# Patient Record
Sex: MALE | Race: BLACK OR AFRICAN AMERICAN | NOT HISPANIC OR LATINO | URBAN - METROPOLITAN AREA
[De-identification: names, ages, dates, MRNs, and addresses within clinical notes are randomized per-mention and may not be internally consistent; named-entity substitution may affect disease eponyms.]

---

## 2018-01-21 ENCOUNTER — INPATIENT (INPATIENT)
Facility: HOSPITAL | Age: 73
LOS: 3 days | Discharge: HOME | DRG: 435 | End: 2018-01-25
Attending: INTERNAL MEDICINE | Admitting: HOSPITALIST
Payer: COMMERCIAL

## 2018-01-21 VITALS
TEMPERATURE: 99 F | SYSTOLIC BLOOD PRESSURE: 166 MMHG | DIASTOLIC BLOOD PRESSURE: 85 MMHG | OXYGEN SATURATION: 97 % | HEART RATE: 97 BPM | RESPIRATION RATE: 16 BRPM

## 2018-01-21 PROCEDURE — 99285 EMERGENCY DEPT VISIT HI MDM: CPT | Mod: 25

## 2018-01-21 PROCEDURE — 93010 ELECTROCARDIOGRAM REPORT: CPT

## 2018-01-21 NOTE — ED ADULT NURSE NOTE - OBJECTIVE STATEMENT
72y male with hx htn, DM, prostate ca presents to the ER c/o abdominal pain. pt is alert and oriented x 4 and speaking coherently. Pt states he recently traveled to [place], pt states he had a sudden onset of abdominal pain 12/24/2017 while in [place]. Pt found to have mass of pancreas and enlarged gall bladder. pt abdomen distended. Pt denies cp, sob, fevers, n/v. c/o diarrhea and chills. Pt states he did not receive treatment while in [place]. Pt in NAD. Pending md fraser. will reassess.

## 2018-01-22 DIAGNOSIS — E87.6 HYPOKALEMIA: ICD-10-CM

## 2018-01-22 DIAGNOSIS — D68.9 COAGULATION DEFECT, UNSPECIFIED: ICD-10-CM

## 2018-01-22 DIAGNOSIS — E80.6 OTHER DISORDERS OF BILIRUBIN METABOLISM: ICD-10-CM

## 2018-01-22 DIAGNOSIS — C25.0 MALIGNANT NEOPLASM OF HEAD OF PANCREAS: ICD-10-CM

## 2018-01-22 DIAGNOSIS — K86.9 DISEASE OF PANCREAS, UNSPECIFIED: ICD-10-CM

## 2018-01-22 DIAGNOSIS — E11.9 TYPE 2 DIABETES MELLITUS WITHOUT COMPLICATIONS: ICD-10-CM

## 2018-01-22 DIAGNOSIS — Z29.9 ENCOUNTER FOR PROPHYLACTIC MEASURES, UNSPECIFIED: ICD-10-CM

## 2018-01-22 DIAGNOSIS — K83.8 OTHER SPECIFIED DISEASES OF BILIARY TRACT: ICD-10-CM

## 2018-01-22 DIAGNOSIS — Z85.46 PERSONAL HISTORY OF MALIGNANT NEOPLASM OF PROSTATE: ICD-10-CM

## 2018-01-22 DIAGNOSIS — K85.10 BILIARY ACUTE PANCREATITIS WITHOUT NECROSIS OR INFECTION: ICD-10-CM

## 2018-01-22 LAB
ALBUMIN SERPL ELPH-MCNC: 3.1 G/DL — LOW (ref 3.3–5)
ALBUMIN SERPL ELPH-MCNC: 3.1 G/DL — LOW (ref 3.3–5)
ALP SERPL-CCNC: 669 U/L — HIGH (ref 40–120)
ALP SERPL-CCNC: 677 U/L — HIGH (ref 40–120)
ALT FLD-CCNC: 219 U/L RC — HIGH (ref 10–45)
ALT FLD-CCNC: 240 U/L RC — HIGH (ref 10–45)
ANION GAP SERPL CALC-SCNC: 11 MMOL/L — SIGNIFICANT CHANGE UP (ref 5–17)
ANION GAP SERPL CALC-SCNC: 18 MMOL/L — HIGH (ref 5–17)
ANISOCYTOSIS BLD QL: SLIGHT — SIGNIFICANT CHANGE UP
APPEARANCE UR: CLEAR — SIGNIFICANT CHANGE UP
APTT BLD: 39.9 SEC — HIGH (ref 27.5–37.4)
APTT BLD: 40.2 SEC — HIGH (ref 27.5–37.4)
AST SERPL-CCNC: 243 U/L — HIGH (ref 10–40)
AST SERPL-CCNC: 246 U/L — HIGH (ref 10–40)
BACTERIA # UR AUTO: ABNORMAL /HPF
BASE EXCESS BLDV CALC-SCNC: 1.4 MMOL/L — SIGNIFICANT CHANGE UP (ref -2–2)
BASOPHILS # BLD AUTO: 0 K/UL — SIGNIFICANT CHANGE UP (ref 0–0.2)
BASOPHILS # BLD AUTO: 0 K/UL — SIGNIFICANT CHANGE UP (ref 0–0.2)
BASOPHILS NFR BLD AUTO: 0 % — SIGNIFICANT CHANGE UP (ref 0–2)
BASOPHILS NFR BLD AUTO: 0.1 % — SIGNIFICANT CHANGE UP (ref 0–2)
BILIRUB SERPL-MCNC: 39 MG/DL — HIGH (ref 0.2–1.2)
BILIRUB SERPL-MCNC: 39.4 MG/DL — HIGH (ref 0.2–1.2)
BILIRUB UR-MCNC: ABNORMAL
BUN SERPL-MCNC: 14 MG/DL — SIGNIFICANT CHANGE UP (ref 7–23)
BUN SERPL-MCNC: 15 MG/DL — SIGNIFICANT CHANGE UP (ref 7–23)
CALCIUM SERPL-MCNC: 9.7 MG/DL — SIGNIFICANT CHANGE UP (ref 8.4–10.5)
CALCIUM SERPL-MCNC: 9.9 MG/DL — SIGNIFICANT CHANGE UP (ref 8.4–10.5)
CANCER AG125 SERPL-ACNC: 28 U/ML — SIGNIFICANT CHANGE UP
CEA SERPL-MCNC: 2.5 NG/ML — SIGNIFICANT CHANGE UP (ref 0–3.8)
CHLORIDE SERPL-SCNC: 100 MMOL/L — SIGNIFICANT CHANGE UP (ref 96–108)
CHLORIDE SERPL-SCNC: 93 MMOL/L — LOW (ref 96–108)
CO2 BLDV-SCNC: 28 MMOL/L — SIGNIFICANT CHANGE UP (ref 22–30)
CO2 SERPL-SCNC: 20 MMOL/L — LOW (ref 22–31)
CO2 SERPL-SCNC: 25 MMOL/L — SIGNIFICANT CHANGE UP (ref 22–31)
COLOR SPEC: ABNORMAL
CREAT SERPL-MCNC: 0.94 MG/DL — SIGNIFICANT CHANGE UP (ref 0.5–1.3)
CREAT SERPL-MCNC: 0.96 MG/DL — SIGNIFICANT CHANGE UP (ref 0.5–1.3)
D DIMER BLD IA.RAPID-MCNC: 329 NG/ML DDU — HIGH
DACRYOCYTES BLD QL SMEAR: SLIGHT — SIGNIFICANT CHANGE UP
DIFF PNL FLD: ABNORMAL
EOSINOPHIL # BLD AUTO: 0.1 K/UL — SIGNIFICANT CHANGE UP (ref 0–0.5)
EOSINOPHIL # BLD AUTO: 0.3 K/UL — SIGNIFICANT CHANGE UP (ref 0–0.5)
EOSINOPHIL NFR BLD AUTO: 0 % — SIGNIFICANT CHANGE UP (ref 0–6)
EOSINOPHIL NFR BLD AUTO: 1.1 % — SIGNIFICANT CHANGE UP (ref 0–6)
EPI CELLS # UR: SIGNIFICANT CHANGE UP /HPF
FIBRINOGEN PPP-MCNC: 851 MG/DL — HIGH (ref 310–510)
GAS PNL BLDV: SIGNIFICANT CHANGE UP
GLUCOSE BLDC GLUCOMTR-MCNC: 137 MG/DL — HIGH (ref 70–99)
GLUCOSE BLDC GLUCOMTR-MCNC: 140 MG/DL — HIGH (ref 70–99)
GLUCOSE BLDC GLUCOMTR-MCNC: 143 MG/DL — HIGH (ref 70–99)
GLUCOSE BLDC GLUCOMTR-MCNC: 171 MG/DL — HIGH (ref 70–99)
GLUCOSE SERPL-MCNC: 153 MG/DL — HIGH (ref 70–99)
GLUCOSE SERPL-MCNC: 167 MG/DL — HIGH (ref 70–99)
GLUCOSE UR QL: NEGATIVE — SIGNIFICANT CHANGE UP
HBA1C BLD-MCNC: 4.8 % — SIGNIFICANT CHANGE UP (ref 4–5.6)
HCO3 BLDV-SCNC: 26 MMOL/L — SIGNIFICANT CHANGE UP (ref 21–29)
HCT VFR BLD CALC: 31.7 % — LOW (ref 39–50)
HCT VFR BLD CALC: 32.2 % — LOW (ref 39–50)
HGB BLD-MCNC: 10.9 G/DL — LOW (ref 13–17)
HGB BLD-MCNC: 11.1 G/DL — LOW (ref 13–17)
HYALINE CASTS # UR AUTO: ABNORMAL
HYPOCHROMIA BLD QL: SLIGHT — SIGNIFICANT CHANGE UP
INR BLD: 1.71 RATIO — HIGH (ref 0.88–1.16)
INR BLD: 1.82 RATIO — HIGH (ref 0.88–1.16)
KETONES UR-MCNC: NEGATIVE — SIGNIFICANT CHANGE UP
LEUKOCYTE ESTERASE UR-ACNC: ABNORMAL
LIDOCAIN IGE QN: >530 U/L — HIGH (ref 7–60)
LYMPHOCYTES # BLD AUTO: 1.6 K/UL — SIGNIFICANT CHANGE UP (ref 1–3.3)
LYMPHOCYTES # BLD AUTO: 1.7 K/UL — SIGNIFICANT CHANGE UP (ref 1–3.3)
LYMPHOCYTES # BLD AUTO: 13.4 % — SIGNIFICANT CHANGE UP (ref 13–44)
LYMPHOCYTES # BLD AUTO: 17 % — SIGNIFICANT CHANGE UP (ref 13–44)
MACROCYTES BLD QL: SLIGHT — SIGNIFICANT CHANGE UP
MCHC RBC-ENTMCNC: 29.6 PG — SIGNIFICANT CHANGE UP (ref 27–34)
MCHC RBC-ENTMCNC: 29.8 PG — SIGNIFICANT CHANGE UP (ref 27–34)
MCHC RBC-ENTMCNC: 34.5 GM/DL — SIGNIFICANT CHANGE UP (ref 32–36)
MCHC RBC-ENTMCNC: 34.6 GM/DL — SIGNIFICANT CHANGE UP (ref 32–36)
MCV RBC AUTO: 85.7 FL — SIGNIFICANT CHANGE UP (ref 80–100)
MCV RBC AUTO: 86.2 FL — SIGNIFICANT CHANGE UP (ref 80–100)
MICROCYTES BLD QL: SLIGHT — SIGNIFICANT CHANGE UP
MONOCYTES # BLD AUTO: 1.6 K/UL — HIGH (ref 0–0.9)
MONOCYTES # BLD AUTO: 2.1 K/UL — HIGH (ref 0–0.9)
MONOCYTES NFR BLD AUTO: 12 % — SIGNIFICANT CHANGE UP (ref 2–14)
MONOCYTES NFR BLD AUTO: 12.3 % — SIGNIFICANT CHANGE UP (ref 2–14)
NEUTROPHILS # BLD AUTO: 10.5 K/UL — HIGH (ref 1.8–7.4)
NEUTROPHILS # BLD AUTO: 9.2 K/UL — HIGH (ref 1.8–7.4)
NEUTROPHILS NFR BLD AUTO: 70 % — SIGNIFICANT CHANGE UP (ref 43–77)
NEUTROPHILS NFR BLD AUTO: 73 % — SIGNIFICANT CHANGE UP (ref 43–77)
NEUTS BAND # BLD: 1 % — SIGNIFICANT CHANGE UP (ref 0–8)
NITRITE UR-MCNC: NEGATIVE — SIGNIFICANT CHANGE UP
PCO2 BLDV: 44 MMHG — SIGNIFICANT CHANGE UP (ref 35–50)
PH BLDV: 7.39 — SIGNIFICANT CHANGE UP (ref 7.35–7.45)
PH UR: 6 — SIGNIFICANT CHANGE UP (ref 5–8)
PLAT MORPH BLD: NORMAL — SIGNIFICANT CHANGE UP
PLATELET # BLD AUTO: 210 K/UL — SIGNIFICANT CHANGE UP (ref 150–400)
PLATELET # BLD AUTO: 255 K/UL — SIGNIFICANT CHANGE UP (ref 150–400)
PO2 BLDV: 28 MMHG — SIGNIFICANT CHANGE UP (ref 25–45)
POTASSIUM SERPL-MCNC: 3.1 MMOL/L — LOW (ref 3.5–5.3)
POTASSIUM SERPL-MCNC: 3.4 MMOL/L — LOW (ref 3.5–5.3)
POTASSIUM SERPL-SCNC: 3.1 MMOL/L — LOW (ref 3.5–5.3)
POTASSIUM SERPL-SCNC: 3.4 MMOL/L — LOW (ref 3.5–5.3)
PROT SERPL-MCNC: 6.2 G/DL — SIGNIFICANT CHANGE UP (ref 6–8.3)
PROT SERPL-MCNC: 6.5 G/DL — SIGNIFICANT CHANGE UP (ref 6–8.3)
PROT UR-MCNC: SIGNIFICANT CHANGE UP
PROTHROM AB SERPL-ACNC: 18.8 SEC — HIGH (ref 9.8–12.7)
PROTHROM AB SERPL-ACNC: 19.9 SEC — HIGH (ref 9.8–12.7)
PSA FREE FLD-MCNC: 0.01 NG/ML — SIGNIFICANT CHANGE UP
PSA FREE FLD-MCNC: 50 — SIGNIFICANT CHANGE UP
PSA FREE MFR FLD: 0.02 NG/ML — SIGNIFICANT CHANGE UP (ref 0–4)
RBC # BLD: 3.7 M/UL — LOW (ref 4.2–5.8)
RBC # BLD: 3.74 M/UL — LOW (ref 4.2–5.8)
RBC # FLD: 17.4 % — HIGH (ref 10.3–14.5)
RBC # FLD: 17.5 % — HIGH (ref 10.3–14.5)
RBC BLD AUTO: ABNORMAL
RBC CASTS # UR COMP ASSIST: ABNORMAL /HPF (ref 0–2)
SAO2 % BLDV: 47 % — LOW (ref 67–88)
SCHISTOCYTES BLD QL AUTO: SLIGHT — SIGNIFICANT CHANGE UP
SODIUM SERPL-SCNC: 131 MMOL/L — LOW (ref 135–145)
SODIUM SERPL-SCNC: 136 MMOL/L — SIGNIFICANT CHANGE UP (ref 135–145)
SP GR SPEC: 1.01 — SIGNIFICANT CHANGE UP (ref 1.01–1.02)
STOMATOCYTES BLD QL SMEAR: SLIGHT — SIGNIFICANT CHANGE UP
TARGETS BLD QL SMEAR: SIGNIFICANT CHANGE UP
TOXIC GRANULES BLD QL SMEAR: PRESENT — SIGNIFICANT CHANGE UP
TROPONIN T SERPL-MCNC: <0.01 NG/ML — SIGNIFICANT CHANGE UP (ref 0–0.06)
UROBILINOGEN FLD QL: NEGATIVE — SIGNIFICANT CHANGE UP
WBC # BLD: 12.2 K/UL — HIGH (ref 3.8–10.5)
WBC # BLD: 14.5 K/UL — HIGH (ref 3.8–10.5)
WBC # FLD AUTO: 12.2 K/UL — HIGH (ref 3.8–10.5)
WBC # FLD AUTO: 14.5 K/UL — HIGH (ref 3.8–10.5)
WBC UR QL: ABNORMAL /HPF (ref 0–5)

## 2018-01-22 PROCEDURE — 76705 ECHO EXAM OF ABDOMEN: CPT | Mod: 26

## 2018-01-22 PROCEDURE — 99223 1ST HOSP IP/OBS HIGH 75: CPT

## 2018-01-22 PROCEDURE — 71250 CT THORAX DX C-: CPT | Mod: 26

## 2018-01-22 PROCEDURE — 74177 CT ABD & PELVIS W/CONTRAST: CPT | Mod: 26

## 2018-01-22 PROCEDURE — 99222 1ST HOSP IP/OBS MODERATE 55: CPT

## 2018-01-22 PROCEDURE — 99222 1ST HOSP IP/OBS MODERATE 55: CPT | Mod: GC

## 2018-01-22 PROCEDURE — 99233 SBSQ HOSP IP/OBS HIGH 50: CPT

## 2018-01-22 RX ORDER — SODIUM CHLORIDE 9 MG/ML
1000 INJECTION, SOLUTION INTRAVENOUS
Qty: 0 | Refills: 0 | Status: DISCONTINUED | OUTPATIENT
Start: 2018-01-22 | End: 2018-01-25

## 2018-01-22 RX ORDER — DEXTROSE 50 % IN WATER 50 %
1 SYRINGE (ML) INTRAVENOUS ONCE
Qty: 0 | Refills: 0 | Status: DISCONTINUED | OUTPATIENT
Start: 2018-01-22 | End: 2018-01-25

## 2018-01-22 RX ORDER — SODIUM CHLORIDE 9 MG/ML
1000 INJECTION INTRAMUSCULAR; INTRAVENOUS; SUBCUTANEOUS ONCE
Qty: 0 | Refills: 0 | Status: COMPLETED | OUTPATIENT
Start: 2018-01-22 | End: 2018-01-22

## 2018-01-22 RX ORDER — INSULIN LISPRO 100/ML
VIAL (ML) SUBCUTANEOUS
Qty: 0 | Refills: 0 | Status: DISCONTINUED | OUTPATIENT
Start: 2018-01-22 | End: 2018-01-25

## 2018-01-22 RX ORDER — PHYTONADIONE (VIT K1) 5 MG
5 TABLET ORAL ONCE
Qty: 0 | Refills: 0 | Status: COMPLETED | OUTPATIENT
Start: 2018-01-22 | End: 2018-01-22

## 2018-01-22 RX ORDER — MORPHINE SULFATE 50 MG/1
4 CAPSULE, EXTENDED RELEASE ORAL EVERY 6 HOURS
Qty: 0 | Refills: 0 | Status: DISCONTINUED | OUTPATIENT
Start: 2018-01-22 | End: 2018-01-25

## 2018-01-22 RX ORDER — DEXTROSE 50 % IN WATER 50 %
12.5 SYRINGE (ML) INTRAVENOUS ONCE
Qty: 0 | Refills: 0 | Status: DISCONTINUED | OUTPATIENT
Start: 2018-01-22 | End: 2018-01-25

## 2018-01-22 RX ORDER — DEXTROSE 50 % IN WATER 50 %
25 SYRINGE (ML) INTRAVENOUS ONCE
Qty: 0 | Refills: 0 | Status: DISCONTINUED | OUTPATIENT
Start: 2018-01-22 | End: 2018-01-25

## 2018-01-22 RX ORDER — POTASSIUM CHLORIDE 20 MEQ
40 PACKET (EA) ORAL EVERY 4 HOURS
Qty: 0 | Refills: 0 | Status: COMPLETED | OUTPATIENT
Start: 2018-01-22 | End: 2018-01-22

## 2018-01-22 RX ORDER — MORPHINE SULFATE 50 MG/1
4 CAPSULE, EXTENDED RELEASE ORAL ONCE
Qty: 0 | Refills: 0 | Status: DISCONTINUED | OUTPATIENT
Start: 2018-01-22 | End: 2018-01-22

## 2018-01-22 RX ORDER — ONDANSETRON 8 MG/1
4 TABLET, FILM COATED ORAL ONCE
Qty: 0 | Refills: 0 | Status: COMPLETED | OUTPATIENT
Start: 2018-01-22 | End: 2018-01-22

## 2018-01-22 RX ORDER — GLUCAGON INJECTION, SOLUTION 0.5 MG/.1ML
1 INJECTION, SOLUTION SUBCUTANEOUS ONCE
Qty: 0 | Refills: 0 | Status: DISCONTINUED | OUTPATIENT
Start: 2018-01-22 | End: 2018-01-25

## 2018-01-22 RX ORDER — SODIUM CHLORIDE 9 MG/ML
3 INJECTION INTRAMUSCULAR; INTRAVENOUS; SUBCUTANEOUS ONCE
Qty: 0 | Refills: 0 | Status: COMPLETED | OUTPATIENT
Start: 2018-01-22 | End: 2018-01-22

## 2018-01-22 RX ORDER — SODIUM CHLORIDE 9 MG/ML
1000 INJECTION INTRAMUSCULAR; INTRAVENOUS; SUBCUTANEOUS
Qty: 0 | Refills: 0 | Status: DISCONTINUED | OUTPATIENT
Start: 2018-01-22 | End: 2018-01-23

## 2018-01-22 RX ORDER — INSULIN LISPRO 100/ML
VIAL (ML) SUBCUTANEOUS AT BEDTIME
Qty: 0 | Refills: 0 | Status: DISCONTINUED | OUTPATIENT
Start: 2018-01-22 | End: 2018-01-25

## 2018-01-22 RX ORDER — POTASSIUM CHLORIDE 20 MEQ
40 PACKET (EA) ORAL ONCE
Qty: 0 | Refills: 0 | Status: COMPLETED | OUTPATIENT
Start: 2018-01-22 | End: 2018-01-22

## 2018-01-22 RX ORDER — ONDANSETRON 8 MG/1
4 TABLET, FILM COATED ORAL EVERY 6 HOURS
Qty: 0 | Refills: 0 | Status: DISCONTINUED | OUTPATIENT
Start: 2018-01-22 | End: 2018-01-25

## 2018-01-22 RX ADMIN — Medication 40 MILLIEQUIVALENT(S): at 20:25

## 2018-01-22 RX ADMIN — MORPHINE SULFATE 4 MILLIGRAM(S): 50 CAPSULE, EXTENDED RELEASE ORAL at 15:19

## 2018-01-22 RX ADMIN — MORPHINE SULFATE 4 MILLIGRAM(S): 50 CAPSULE, EXTENDED RELEASE ORAL at 00:50

## 2018-01-22 RX ADMIN — ONDANSETRON 4 MILLIGRAM(S): 8 TABLET, FILM COATED ORAL at 00:49

## 2018-01-22 RX ADMIN — Medication 40 MILLIEQUIVALENT(S): at 15:19

## 2018-01-22 RX ADMIN — Medication 101 MILLIGRAM(S): at 15:18

## 2018-01-22 RX ADMIN — SODIUM CHLORIDE 500 MILLILITER(S): 9 INJECTION INTRAMUSCULAR; INTRAVENOUS; SUBCUTANEOUS at 03:39

## 2018-01-22 RX ADMIN — MORPHINE SULFATE 4 MILLIGRAM(S): 50 CAPSULE, EXTENDED RELEASE ORAL at 03:39

## 2018-01-22 RX ADMIN — Medication 40 MILLIEQUIVALENT(S): at 03:39

## 2018-01-22 RX ADMIN — SODIUM CHLORIDE 500 MILLILITER(S): 9 INJECTION INTRAMUSCULAR; INTRAVENOUS; SUBCUTANEOUS at 04:16

## 2018-01-22 RX ADMIN — SODIUM CHLORIDE 3 MILLILITER(S): 9 INJECTION INTRAMUSCULAR; INTRAVENOUS; SUBCUTANEOUS at 00:52
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
## 2018-01-22 NOTE — PROGRESS NOTE ADULT - PROBLEM SELECTOR PLAN 1
pancreatic mass in setting of weight loss, abdominal pain, decrease appetite suspicious for pancreatic CA  -consulted [facility] for EUS/ERCP with FNA +/- stent, possibly planned loc if INR <1.5, NPO after MN  -CT chest with no evidence of metastatic disease  -f/u Ca 19-9, , CEA, PSA  -will consult surg and medical onc pancreatic mass in setting of weight loss, abdominal pain, decrease appetite suspicious for pancreatic CA  -consulted [facility] for EUS/ERCP with FNA +/- stent, possibly planned loc if INR <1.5, NPO after MN  -CT chest with no evidence of metastatic disease  -f/u Ca 19-9, , CEA, PSA  -will consult surg and medical onc  -IV morphine 4mg q6 prn severe pain and zofran 4mg q6 prn

## 2018-01-22 NOTE — CONSULT NOTE ADULT - PROBLEM SELECTOR RECOMMENDATION 9
- plan for EUS and ERCP when INR is < 1.5 (either tomorrow or Wednesday)  - please give SQ or IV vitamin K to correct coagulapathy  - regular diet for now - plan for EUS and ERCP when INR is < 1.5 (either tomorrow or Wednesday)  - please give SQ or IV vitamin K to correct coagulapathy  - regular diet for now  - NPO after midnight
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
## 2018-01-22 NOTE — CONSULT NOTE ADULT - PROBLEM SELECTOR RECOMMENDATION 9
No evidence of metastatic disease on imaging  no mention of blood vessel involvement on CT No evidence of metastatic disease on imaging  no mention of blood vessel involvement on CT  patient will benefit from surgical resection of mass   f/u surg onc recommendations  f/u Ca 19-9, CEA  consider checking AFP  -will f/u EUS

## 2018-01-22 NOTE — PROGRESS NOTE ADULT - SUBJECTIVE AND OBJECTIVE BOX
Patient is a 72y old  Male who presents with a chief complaint of Jaundice, RUQ pain, pancreatic mass and possible pancreatitis (2018 07:14)      SUBJECTIVE / OVERNIGHT EVENTS: Patient reports vague abdominal pain x 1 month, malaise low apetite and lots of weight loss (could not quantify). Stool and urine have turned darker. Denies any current abd pain, n/v, fever, chills.     MEDICATIONS  (STANDING):  dextrose 5%. 1000 milliLiter(s) (50 mL/Hr) IV Continuous <Continuous>  dextrose 50% Injectable 12.5 Gram(s) IV Push once  dextrose 50% Injectable 25 Gram(s) IV Push once  dextrose 50% Injectable 25 Gram(s) IV Push once  insulin lispro (HumaLOG) corrective regimen sliding scale   SubCutaneous three times a day before meals  insulin lispro (HumaLOG) corrective regimen sliding scale   SubCutaneous at bedtime  phytonadione  IVPB 5 milliGRAM(s) IV Intermittent once  potassium chloride    Tablet ER 40 milliEquivalent(s) Oral every 4 hours  sodium chloride 0.9%. 1000 milliLiter(s) (75 mL/Hr) IV Continuous <Continuous>    MEDICATIONS  (PRN):  dextrose Gel 1 Dose(s) Oral once PRN Blood Glucose LESS THAN 70 milliGRAM(s)/deciliter  glucagon  Injectable 1 milliGRAM(s) IntraMuscular once PRN Glucose LESS THAN 70 milligrams/deciliter  morphine  - Injectable 4 milliGRAM(s) IV Push every 6 hours PRN Severe Pain (7 - 10)  ondansetron Injectable 4 milliGRAM(s) IV Push every 6 hours PRN Nausea and/or Vomiting      Vital Signs Last 24 Hrs  T(C): 36.7 (2018 08:26), Max: 37.4 (2018 21:44)  T(F): 98.1 (2018 08:26), Max: 99.3 (2018 21:44)  HR: 83 (2018 08:26) (70 - 97)  BP: 154/71 (2018 08:26) (120/81 - 166/85)  BP(mean): --  RR: 18 (2018 08:26) (16 - 18)  SpO2: 99% (2018 08:26) (97% - 99%)  CAPILLARY BLOOD GLUCOSE      POCT Blood Glucose.: 143 mg/dL (2018 11:23)  POCT Blood Glucose.: 137 mg/dL (2018 08:29)    I&O's Summary      PHYSICAL EXAM:  GENERAL: NAD, well-developed  HEAD:  Atraumatic, Normocephalic  EYES: +scleral icterus  CHEST/LUNG: Clear to auscultation bilaterally; No wheeze  HEART: Regular rate and rhythm; No murmurs, rubs, or gallops  ABDOMEN: Soft, Nontender, mild distention owel sounds present  EXTREMITIES:  2+ Peripheral Pulses, No clubbing, cyanosis, or edema  PSYCH: AAOx3  NEUROLOGY: non-focal  SKIN: palate and sublingual jaundice,    LABS:                        10.9   12.2  )-----------( 255      ( 2018 11:04 )             31.7         136  |  100  |  14  ----------------------------<  153<H>  3.4<L>   |  25  |  0.94    Ca    9.9      2018 11:04    TPro  6.2  /  Alb  3.1<L>  /  TBili  39.4<H>  /  DBili  x   /  AST  246<H>  /  ALT  219<H>  /  AlkPhos  677<H>      PT/INR - ( 2018 11:04 )   PT: 18.8 sec;   INR: 1.71 ratio         PTT - ( 2018 11:04 )  PTT:39.9 sec  CARDIAC MARKERS ( 2018 00:46 )  x     / <0.01 ng/mL / x     / x     / x          Urinalysis Basic - ( 2018 01:26 )    Color: Brown / Appearance: Clear / S.014 / pH: x  Gluc: x / Ketone: Negative  / Bili: Large / Urobili: Negative   Blood: x / Protein: Trace / Nitrite: Negative   Leuk Esterase: Trace / RBC: 2-5 /HPF / WBC 5-10 /HPF   Sq Epi: x / Non Sq Epi: Occasional /HPF / Bacteria: Few /HPF        RADIOLOGY & ADDITIONAL TESTS:    Imaging Personally Reviewed: CT chesT: IMPRESSION: Redemonstration of a pancreatic head mass with ductal dilatation.No evidence of metastases.  Ct abd/pelvis: Pancreatic head mass with resultant intrahepatic and extrahepatic biliary ductal dilatation, which is grossly unchanged from 2018. Additional 1.2 cm cystic lesion in body ofpancreas. Pancreatic ductal dilatation, increased since 2018. Mild inflammatory changes surrounding the pancreatic tail may reflect superimposed acute pancreatitis. Correlate with amylase/lipase.      Consultant(s) Notes Reviewed:  GI    Care Discussed with Consultants/Other Providers:

## 2018-01-22 NOTE — PROGRESS NOTE ADULT - PROBLEM SELECTOR PROBLEM 6
History of prostate cancer Type 2 diabetes mellitus without complication, without long-term current use of insulin

## 2018-01-22 NOTE — H&P ADULT - HISTORY OF PRESENT ILLNESS
73yo M w/pmhx htn (no longer on meds), dm2 (no longer on meds), and BPH as well as prostate cancer s/p prostatectomy now presenting with jaundice and abdominal pain x 1 month. Patient reports that around x-mas time he began to notice yellowing of his skin and eyes and at the same time noticed a RUQ pain that would come and go without clear association with food. This pain was generally mild about 4-5 in intensity when flaring and would occasionally radiate to the lower abdomen or side of his torso.  No fevers or chills. Over the last month he has had progressively worsening appetite, though no nausea/vomiting. Also has had some intermittent constipation though this is in setting of significantly decreased po intake. + weight loss though pt is not sure how many pounds-however family members have noticed that he is much thinner. Patient went to a hospital in [place] and had a CT scan on January 4th concerning for a pancreatic mass and came back to [place] for further care.

## 2018-01-22 NOTE — ED PROVIDER NOTE - OBJECTIVE STATEMENT
72M BPH, DM and HTN p/w c/o abdominal pain progressively worsening x1 month. Pt was overseas in [facility] where he was diagnosed with pancreatic head cancer on 1/4/18 by CT    CT pancreatic head mass: 2.5 cm x 1.5 cm mass causing billiary tree obstruction and contacting anterior surface of IVC, 1/5 cm simple body cyst 72M BPH, DM and HTN p/w c/o abdominal pain progressively worsening x1 month. Pt was overseas in [facility] where he was diagnosed with pancreatic head cancer on 1/4/18 by CT    CT pancreatic head mass: 2.5 cm x 1.5 cm mass causing billiary tree obstruction and contacting anterior surface of IVC, 1/5 cm simple body cyst  Dorota Colon [phone] 72M BPH, DM and HTN p/w c/o abdominal pain progressively worsening x1 month. Pt was overseas in [facility] where he was diagnosed with pancreatic head cancer on 1/4/18 by CT    CT pancreatic head mass: 2.5 cm x 1.5 cm mass causing billiary tree obstruction and contacting anterior surface of IVC, 1/5 cm simple body cyst  1/10/18: Tbili 527.9 (MX 20.5), ALK PHOS 706, 291/411, TRIGLYCERIDE 1.44  Dorota Colon [phone] 72M BPH, DM and HTN p/w c/o abdominal pain progressively worsening x1 month. Pt was overseas in [place] where he was diagnosed with pancreatic head cancer on 1/4/18 by CT    CT pancreatic head mass: 2.5 cm x 1.5 cm mass causing billary tree obstruction and contacting anterior surface of IVC, 1/5 cm simple body cyst  1/10/18: Tbili 527.9 (MX 20.5), ALK PHOS 706, 291/411, TRIGLYCERIDE 1.44  Dorota Colon [phone]

## 2018-01-22 NOTE — CONSULT NOTE ADULT - ATTENDING COMMENTS
Patient seen and examined by me. 71 yo Man admitted for pancreatic mass, jaundice, and severe hyperbilirubinemia. He will need evaluation by GI and Surgical Oncology for biopsy and possible surgery of the pancreatic mass. Agree with assessment and plan of Dr Ferguson.
Impression:    #1.  Pancreatic head mass and pancreatic body cyst.  Suspect malignancy    #2.  Obstructive jaundice due to #1    #3.  Intrinsic liver disease contributing to hyperbilirubinemia (family history of sickle cell)    #4.  Mild coagulopathy (INR 1.8)    Recommendations:    #1.  Eventual EUS/FNA and ERCP/biliary stent when INR around 1.5    #2.  VItamin K PO or SQ to help correct coagulopathy if possible.

## 2018-01-22 NOTE — ED PROVIDER NOTE - MEDICAL DECISION MAKING DETAILS
Newly diagnosed pancreatic cancer with biliary obstruction will need admission for biliary decompression and biopsy. LUI
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
## 2018-01-22 NOTE — H&P ADULT - PROBLEM SELECTOR PLAN 2
Newly discovered mass  Needs GI consult for further workup and sampling of mass  Pain control  Will also need onc consult at some point

## 2018-01-22 NOTE — H&P ADULT - NSHPLABSRESULTS_GEN_ALL_CORE
Labs personally reviewed:                        11.1   14.5  )-----------( 210      ( 2018 00:46 )             32.2       -    131<L>  |  93<L>  |  15  ----------------------------<  167<H>  3.1<L>   |  20<L>  |  0.96    Ca    9.7      2018 00:46    TPro  6.5  /  Alb  3.1<L>  /  TBili  39.0<H>  /  DBili  x   /  AST  243<H>  /  ALT  240<H>  /  AlkPhos  669<H>        CARDIAC MARKERS ( 2018 00:46 )  x     / <0.01 ng/mL / x     / x     / x            LIVER FUNCTIONS - ( 2018 00:46 )  Alb: 3.1 g/dL / Pro: 6.5 g/dL / ALK PHOS: 669 U/L / ALT: 240 U/L RC / AST: 243 U/L / GGT: x             PT/INR - ( 2018 00:46 )   PT: 19.9 sec;   INR: 1.82 ratio         PTT - ( 2018 00:46 )  PTT:40.2 sec    Urinalysis Basic - ( 2018 01:26 )    Color: Brown / Appearance: Clear / S.014 / pH: x  Gluc: x / Ketone: Negative  / Bili: Large / Urobili: Negative   Blood: x / Protein: Trace / Nitrite: Negative   Leuk Esterase: Trace / RBC: 2-5 /HPF / WBC 5-10 /HPF   Sq Epi: x / Non Sq Epi: Occasional /HPF / Bacteria: Few /HPF      Imaging personally reviewed-pancreatic mass, possible pancreatitis, biliary system distension.     EKG personally reviewed-sinus rhythm with pvcs.

## 2018-01-22 NOTE — H&P ADULT - NSHPPHYSICALEXAM_GEN_ALL_CORE
Vital Signs Last 24 Hrs  T(C): 36.8 (22 Jan 2018 05:52), Max: 37.4 (21 Jan 2018 21:44)  T(F): 98.2 (22 Jan 2018 05:52), Max: 99.3 (21 Jan 2018 21:44)  HR: 70 (22 Jan 2018 05:52) (70 - 97)  BP: 120/81 (22 Jan 2018 05:52) (120/81 - 166/85)  BP(mean): --  RR: 16 (22 Jan 2018 05:52) (16 - 17)  SpO2: 98% (22 Jan 2018 05:52) (97% - 98%)    GENERAL: No acute distress, well-developed, jaundiced  HEAD:  Atraumatic, Normocephalic  EYES: EOMI, scleral icterus  ENT: Oral mucosa moist  NECK: Neck supple  CHEST/LUNG: Clear to auscultation bilaterally; No wheeze, no rales, no rhonchi.    HEART: Regular rate and rhythm; No murmurs, rubs, or gallops  ABDOMEN: Soft, mild distension, mild TTP in RUQ; Bowel sounds present  EXTREMITIES:  No clubbing, cyanosis, or edema  VASCULAR: Posterior tibialis pulses intact bilaterally  PSYCH: Normal behavior, normal affect  NEUROLOGY: AAOx3  SKIN: grossly warm and dry, jaundiced

## 2018-01-22 NOTE — H&P ADULT - PROBLEM SELECTOR PLAN 1
Patient with likely pancreatitis related to biliary obstruction and pancreatic mass.   NPO  IV fluid support  pain control as needed

## 2018-01-22 NOTE — PROGRESS NOTE ADULT - PROBLEM SELECTOR PLAN 2
mild pancreatitis seen on CT with elevated Lipase but exam not suggestive of acute pancreatitis  -c/w IVF for now  -tolerating diet without worsening pain n/v  -appreciate GI recs  - mild pancreatitis seen on CT with elevated Lipase but exam not suggestive of acute pancreatitis  -c/w IVF for now  -tolerating diet without worsening pain n/v  -appreciate GI recs Tbili 39- due to intrahepatic and extrahepatic dilation obstruction from pancreatic mass. Currently no s/s of cholangitis, afebrile, mild leukocytosis  -advanced GI consulted for possible stenting as well  -if spikes fever or HD compromise, would start zosyn, consult MICU, IR, GI for urgent decompression

## 2018-01-22 NOTE — H&P ADULT - NSHPREVIEWOFSYSTEMS_GEN_ALL_CORE
REVIEW OF SYSTEMS:  CONSTITUTIONAL: No weakness, fevers or chills  EYES: No visual changes  ENT: No dysphagia  NECK: No stiffness  RESPIRATORY: No cough, wheezing, hemoptysis; No shortness of breath  CARDIOVASCULAR: No chest pain or palpitations; No lower extremity edema  GASTROINTESTINAL: see HPI  GENITOURINARY: No dysuria, frequency or hematuria  NEUROLOGICAL: No numbness or weakness  SKIN: mild itching, no rash, jaundice  ALLERGIES: No drug reactions

## 2018-01-22 NOTE — ED PROVIDER NOTE - CARE PLAN
Principal Discharge DX:	Hyperbilirubinemia Principal Discharge DX:	Hyperbilirubinemia  Secondary Diagnosis:	Malignant neoplasm of head of pancreas

## 2018-01-22 NOTE — H&P ADULT - PMH
BPH (Benign Prostatic Hyperplasia)    DM (Diabetes Mellitus) '  2009    HTN (Hypertension)' 2009    Prostate cancer

## 2018-01-22 NOTE — PROGRESS NOTE ADULT - PROBLEM SELECTOR PLAN 3
Tbili 39- due to intrahepatic and extrahepatic dilation obstruction from pancreatic mass. Currently no s/s of cholangitis, afebrile, mild leukocytosis  -advanced GI consulted for possible stenting as well mild pancreatitis seen on CT with elevated Lipase but exam not suggestive of acute pancreatitis  -c/w IVF for now  -tolerating diet without worsening pain n/v  -appreciate GI recs

## 2018-01-22 NOTE — CONSULT NOTE ADULT - ASSESSMENT
72M w/ obstructing pancreatic head mass  - plan for GI intervention tomorrow  - f/u possible pathology  - if stenting is possible, we would like at least 2 weeks for biliary decompression and normalization of LFTs before any planned surgical intervention  -  and CEA levels normal, please send  as well  - will follow  - d/w Dr. Robert Mccarthy MD  0130

## 2018-01-22 NOTE — CONSULT NOTE ADULT - ASSESSMENT
73 yo man with obstructive jaundice, found to have pancreatic head mass causing double duct sign. Patient has elevated INR to 1.8 likely in the setting of biliary obstruction and effective vitamin K deficiency due to lack of bile acids allowing for absorption of fat soluble vitamins. 73 yo man with obstructive jaundice, found to have pancreatic head mass causing double duct sign. Patient has elevated INR to 1.8 likely in the setting of biliary obstruction and effective vitamin K deficiency due to lack of bile acids allowing for absorption of fat soluble vitamins. Patient also with leukocytosis but no fevers, clinically he does not appear to have cholangitis. It should be noted that cholangitis is very rare in cases of obstruction due to pancreatic or biliary ductal mass lesions.

## 2018-01-23 ENCOUNTER — RESULT REVIEW (OUTPATIENT)
Age: 73
End: 2018-01-23

## 2018-01-23 DIAGNOSIS — N17.9 ACUTE KIDNEY FAILURE, UNSPECIFIED: ICD-10-CM

## 2018-01-23 LAB
AFP-TM SERPL-MCNC: 3 NG/ML — SIGNIFICANT CHANGE UP
ANION GAP SERPL CALC-SCNC: 17 MMOL/L — SIGNIFICANT CHANGE UP (ref 5–17)
APTT BLD: 35.9 SEC — SIGNIFICANT CHANGE UP (ref 27.5–37.4)
BUN SERPL-MCNC: 17 MG/DL — SIGNIFICANT CHANGE UP (ref 7–23)
CALCIUM SERPL-MCNC: 9.8 MG/DL — SIGNIFICANT CHANGE UP (ref 8.4–10.5)
CANCER AG19-9 SERPL-ACNC: 791.8 U/ML — HIGH
CHLORIDE SERPL-SCNC: 102 MMOL/L — SIGNIFICANT CHANGE UP (ref 96–108)
CO2 SERPL-SCNC: 21 MMOL/L — LOW (ref 22–31)
CREAT SERPL-MCNC: 1.39 MG/DL — HIGH (ref 0.5–1.3)
GLUCOSE BLDC GLUCOMTR-MCNC: 125 MG/DL — HIGH (ref 70–99)
GLUCOSE BLDC GLUCOMTR-MCNC: 146 MG/DL — HIGH (ref 70–99)
GLUCOSE BLDC GLUCOMTR-MCNC: 147 MG/DL — HIGH (ref 70–99)
GLUCOSE BLDC GLUCOMTR-MCNC: 227 MG/DL — HIGH (ref 70–99)
GLUCOSE SERPL-MCNC: 136 MG/DL — HIGH (ref 70–99)
HCT VFR BLD CALC: 31.3 % — LOW (ref 39–50)
HGB BLD-MCNC: 10 G/DL — LOW (ref 13–17)
INR BLD: 1.22 RATIO — HIGH (ref 0.88–1.16)
MCHC RBC-ENTMCNC: 27.3 PG — SIGNIFICANT CHANGE UP (ref 27–34)
MCHC RBC-ENTMCNC: 31.9 GM/DL — LOW (ref 32–36)
MCV RBC AUTO: 85.5 FL — SIGNIFICANT CHANGE UP (ref 80–100)
PLATELET # BLD AUTO: 253 K/UL — SIGNIFICANT CHANGE UP (ref 150–400)
POTASSIUM SERPL-MCNC: 4.5 MMOL/L — SIGNIFICANT CHANGE UP (ref 3.5–5.3)
POTASSIUM SERPL-SCNC: 4.5 MMOL/L — SIGNIFICANT CHANGE UP (ref 3.5–5.3)
PROTHROM AB SERPL-ACNC: 13.8 SEC — HIGH (ref 10–13.1)
RBC # BLD: 3.66 M/UL — LOW (ref 4.2–5.8)
RBC # FLD: 17.6 % — HIGH (ref 10.3–14.5)
SODIUM SERPL-SCNC: 140 MMOL/L — SIGNIFICANT CHANGE UP (ref 135–145)
WBC # BLD: 13.95 K/UL — HIGH (ref 3.8–10.5)
WBC # FLD AUTO: 13.95 K/UL — HIGH (ref 3.8–10.5)

## 2018-01-23 PROCEDURE — 99233 SBSQ HOSP IP/OBS HIGH 50: CPT | Mod: GC

## 2018-01-23 PROCEDURE — 43242 EGD US FINE NEEDLE BX/ASPIR: CPT | Mod: 59,GC

## 2018-01-23 PROCEDURE — 43274 ERCP DUCT STENT PLACEMENT: CPT | Mod: GC

## 2018-01-23 PROCEDURE — 74328 X-RAY BILE DUCT ENDOSCOPY: CPT | Mod: 26,GC

## 2018-01-23 PROCEDURE — 99232 SBSQ HOSP IP/OBS MODERATE 35: CPT | Mod: 25,GC

## 2018-01-23 PROCEDURE — 88307 TISSUE EXAM BY PATHOLOGIST: CPT | Mod: 26

## 2018-01-23 RX ORDER — DOCUSATE SODIUM 100 MG
100 CAPSULE ORAL THREE TIMES A DAY
Qty: 0 | Refills: 0 | Status: DISCONTINUED | OUTPATIENT
Start: 2018-01-23 | End: 2018-01-25

## 2018-01-23 RX ORDER — SENNA PLUS 8.6 MG/1
2 TABLET ORAL AT BEDTIME
Qty: 0 | Refills: 0 | Status: DISCONTINUED | OUTPATIENT
Start: 2018-01-23 | End: 2018-01-25

## 2018-01-23 RX ORDER — SODIUM CHLORIDE 9 MG/ML
1000 INJECTION, SOLUTION INTRAVENOUS
Qty: 0 | Refills: 0 | Status: DISCONTINUED | OUTPATIENT
Start: 2018-01-23 | End: 2018-01-25

## 2018-01-23 RX ADMIN — MORPHINE SULFATE 4 MILLIGRAM(S): 50 CAPSULE, EXTENDED RELEASE ORAL at 06:14

## 2018-01-23 RX ADMIN — MORPHINE SULFATE 4 MILLIGRAM(S): 50 CAPSULE, EXTENDED RELEASE ORAL at 05:59

## 2018-01-23 RX ADMIN — SODIUM CHLORIDE 100 MILLILITER(S): 9 INJECTION, SOLUTION INTRAVENOUS at 14:34

## 2018-01-23 RX ADMIN — Medication 2: at 17:26

## 2018-01-23 NOTE — PROGRESS NOTE ADULT - PROBLEM SELECTOR PLAN 1
pancreatic mass in setting of weight loss, abdominal pain, decrease appetite suspicious for pancreatic CA  GI consulted patient for ERCP/EUS +/- FNA with stent. Follow up. Keep patient NPO. Continue with iv hydration.   Surgery consult appreciated, recommend biliary decompression.   -IV morphine 4mg q6 prn severe pain and zofran 4mg q6 prn

## 2018-01-23 NOTE — PROGRESS NOTE ADULT - PROBLEM SELECTOR PLAN 2
Tbili 39- due to intrahepatic and extrahepatic dilation obstruction from pancreatic mass. Currently no s/s of cholangitis, afebrile, mild leukocytosis  Monitor Bilirubin levels.

## 2018-01-23 NOTE — PROGRESS NOTE ADULT - SUBJECTIVE AND OBJECTIVE BOX
Patient is a 72y old  Male who presents with a chief complaint of Jaundice, RUQ pain, pancreatic mass and possible pancreatitis (22 Jan 2018 07:14)      SUBJECTIVE / OVERNIGHT EVENTS: Patient reports vague abdominal pain x 1 month, malaise low apetite and lots of weight loss (could not quantify). Stool and urine have turned darker. Denies any current abd pain, n/v, fever, chills.     T(C): 37.6 (01-23-18 @ 05:09), Max: 37.6 (01-23-18 @ 05:09)  HR: 61 (01-23-18 @ 05:09) (61 - 61)  BP: 158/78 (01-23-18 @ 05:09) (158/78 - 158/78)  RR: 18 (01-23-18 @ 05:09) (18 - 18)  SpO2: 100% (01-23-18 @ 05:09) (100% - 100%)    MEDICATIONS  (STANDING):  dextrose 5%. 1000 milliLiter(s) (50 mL/Hr) IV Continuous <Continuous>  dextrose 50% Injectable 12.5 Gram(s) IV Push once  dextrose 50% Injectable 25 Gram(s) IV Push once  dextrose 50% Injectable 25 Gram(s) IV Push once  insulin lispro (HumaLOG) corrective regimen sliding scale   SubCutaneous three times a day before meals  insulin lispro (HumaLOG) corrective regimen sliding scale   SubCutaneous at bedtime  sodium chloride 0.9%. 1000 milliLiter(s) (75 mL/Hr) IV Continuous <Continuous>    MEDICATIONS  (PRN):  dextrose Gel 1 Dose(s) Oral once PRN Blood Glucose LESS THAN 70 milliGRAM(s)/deciliter  glucagon  Injectable 1 milliGRAM(s) IntraMuscular once PRN Glucose LESS THAN 70 milligrams/deciliter  morphine  - Injectable 4 milliGRAM(s) IV Push every 6 hours PRN Severe Pain (7 - 10)  ondansetron Injectable 4 milliGRAM(s) IV Push every 6 hours PRN Nausea and/or Vomiting  CAPILLARY BLOOD GLUCOSE      POCT Blood Glucose.: 146 mg/dL (23 Jan 2018 06:24)  POCT Blood Glucose.: 171 mg/dL (22 Jan 2018 21:29)  POCT Blood Glucose.: 140 mg/dL (22 Jan 2018 17:14)        PHYSICAL EXAM:  GENERAL: NAD, well-developed  HEAD:  Atraumatic, Normocephalic  EYES: +scleral icterus  CHEST/LUNG: Clear to auscultation bilaterally; No wheeze  HEART: Regular rate and rhythm; No murmurs, rubs, or gallops  ABDOMEN: Soft, Nontender, mild distention owel sounds present  EXTREMITIES:  2+ Peripheral Pulses, No clubbing, cyanosis, or edema  PSYCH: AAOx3  NEUROLOGY: non-focal  SKIN: palate and sublingual jaundice,                          10.0   13.95 )-----------( 253      ( 23 Jan 2018 07:30 )             31.3       CARDIAC MARKERS ( 22 Jan 2018 00:46 )  x     / <0.01 ng/mL / x     / x     / x          LIVER FUNCTIONS - ( 22 Jan 2018 11:04 )  Alb: 3.1 g/dL / Pro: 6.2 g/dL / ALK PHOS: 677 U/L / ALT: 219 U/L RC / AST: 246 U/L / GGT: x           PT/INR - ( 23 Jan 2018 07:30 )   PT: 13.8 sec;   INR: 1.22 ratio         PTT - ( 23 Jan 2018 07:30 )  PTT:35.9 sec  140|102|17<136  4.5|21|1.39  9.8,--,--  01-23 @ 07:39  INR 1.22  Ca19-9 791.8   Ca125 2.8  CEA 2.5       RADIOLOGY & ADDITIONAL TESTS:    Imaging Personally Reviewed: CT chesT: IMPRESSION: Redemonstration of a pancreatic head mass with ductal dilatation.No evidence of metastases.  Ct abd/pelvis: Pancreatic head mass with resultant intrahepatic and extrahepatic biliary ductal dilatation, which is grossly unchanged from 1/4/2018. Additional 1.2 cm cystic lesion in body ofpancreas. Pancreatic ductal dilatation, increased since 1/4/2018. Mild inflammatory changes surrounding the pancreatic tail may reflect superimposed acute pancreatitis. Correlate with amylase/lipase.      Consultant(s) Notes Reviewed:  GI    Care Discussed with Consultants/Other Providers:

## 2018-01-23 NOTE — PROGRESS NOTE ADULT - PROBLEM SELECTOR PLAN 3
mild pancreatitis seen on CT with elevated Lipase but exam not suggestive of acute pancreatitis  -c/w IVF for now  -tolerating diet without worsening pain n/v  -appreciate GI recs
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
## 2018-01-24 LAB
ALBUMIN SERPL ELPH-MCNC: 3.1 G/DL — LOW (ref 3.3–5)
ALP SERPL-CCNC: 737 U/L — HIGH (ref 40–120)
ALT FLD-CCNC: 250 U/L — HIGH (ref 10–45)
ANION GAP SERPL CALC-SCNC: 16 MMOL/L — SIGNIFICANT CHANGE UP (ref 5–17)
AST SERPL-CCNC: 336 U/L — HIGH (ref 10–40)
BILIRUB SERPL-MCNC: 33.1 MG/DL — HIGH (ref 0.2–1.2)
BUN SERPL-MCNC: 18 MG/DL — SIGNIFICANT CHANGE UP (ref 7–23)
CALCIUM SERPL-MCNC: 9.8 MG/DL — SIGNIFICANT CHANGE UP (ref 8.4–10.5)
CHLORIDE SERPL-SCNC: 100 MMOL/L — SIGNIFICANT CHANGE UP (ref 96–108)
CO2 SERPL-SCNC: 22 MMOL/L — SIGNIFICANT CHANGE UP (ref 22–31)
CREAT SERPL-MCNC: 1.32 MG/DL — HIGH (ref 0.5–1.3)
GLUCOSE BLDC GLUCOMTR-MCNC: 149 MG/DL — HIGH (ref 70–99)
GLUCOSE BLDC GLUCOMTR-MCNC: 153 MG/DL — HIGH (ref 70–99)
GLUCOSE BLDC GLUCOMTR-MCNC: 195 MG/DL — HIGH (ref 70–99)
GLUCOSE BLDC GLUCOMTR-MCNC: 201 MG/DL — HIGH (ref 70–99)
GLUCOSE SERPL-MCNC: 156 MG/DL — HIGH (ref 70–99)
HCT VFR BLD CALC: 31.6 % — LOW (ref 39–50)
HGB BLD-MCNC: 10.2 G/DL — LOW (ref 13–17)
INR BLD: 1.16 RATIO — SIGNIFICANT CHANGE UP (ref 0.88–1.16)
MCHC RBC-ENTMCNC: 27.6 PG — SIGNIFICANT CHANGE UP (ref 27–34)
MCHC RBC-ENTMCNC: 32.3 GM/DL — SIGNIFICANT CHANGE UP (ref 32–36)
MCV RBC AUTO: 85.4 FL — SIGNIFICANT CHANGE UP (ref 80–100)
PLATELET # BLD AUTO: 248 K/UL — SIGNIFICANT CHANGE UP (ref 150–400)
POTASSIUM SERPL-MCNC: 3.9 MMOL/L — SIGNIFICANT CHANGE UP (ref 3.5–5.3)
POTASSIUM SERPL-SCNC: 3.9 MMOL/L — SIGNIFICANT CHANGE UP (ref 3.5–5.3)
PROT SERPL-MCNC: 6.9 G/DL — SIGNIFICANT CHANGE UP (ref 6–8.3)
PROTHROM AB SERPL-ACNC: 13.1 SEC — SIGNIFICANT CHANGE UP (ref 10–13.1)
RBC # BLD: 3.7 M/UL — LOW (ref 4.2–5.8)
RBC # FLD: 17.1 % — HIGH (ref 10.3–14.5)
SODIUM SERPL-SCNC: 138 MMOL/L — SIGNIFICANT CHANGE UP (ref 135–145)
WBC # BLD: 14.4 K/UL — HIGH (ref 3.8–10.5)
WBC # FLD AUTO: 14.4 K/UL — HIGH (ref 3.8–10.5)

## 2018-01-24 PROCEDURE — 99233 SBSQ HOSP IP/OBS HIGH 50: CPT

## 2018-01-24 PROCEDURE — 99232 SBSQ HOSP IP/OBS MODERATE 35: CPT | Mod: GC

## 2018-01-24 RX ADMIN — Medication 100 MILLIGRAM(S): at 05:43

## 2018-01-24 RX ADMIN — MORPHINE SULFATE 4 MILLIGRAM(S): 50 CAPSULE, EXTENDED RELEASE ORAL at 21:33

## 2018-01-24 RX ADMIN — Medication 100 MILLIGRAM(S): at 21:05

## 2018-01-24 RX ADMIN — Medication 1: at 17:23

## 2018-01-24 RX ADMIN — MORPHINE SULFATE 4 MILLIGRAM(S): 50 CAPSULE, EXTENDED RELEASE ORAL at 06:00

## 2018-01-24 RX ADMIN — Medication 100 MILLIGRAM(S): at 13:12

## 2018-01-24 RX ADMIN — Medication 1: at 12:30

## 2018-01-24 RX ADMIN — MORPHINE SULFATE 4 MILLIGRAM(S): 50 CAPSULE, EXTENDED RELEASE ORAL at 05:43

## 2018-01-24 RX ADMIN — SODIUM CHLORIDE 100 MILLILITER(S): 9 INJECTION, SOLUTION INTRAVENOUS at 21:07

## 2018-01-24 RX ADMIN — MORPHINE SULFATE 4 MILLIGRAM(S): 50 CAPSULE, EXTENDED RELEASE ORAL at 23:08
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
## 2018-01-24 NOTE — PROGRESS NOTE ADULT - SUBJECTIVE AND OBJECTIVE BOX
Patient is a 72y old  Male who presents with a chief complaint of Jaundice, RUQ pain, pancreatic mass and possible pancreatitis (22 Jan 2018 07:14)      SUBJECTIVE / OVERNIGHT EVENTS: Patient feels much improved, tolerating diet.   ROS otherwise negative.     T(C): 36.2 (01-24-18 @ 12:56), Max: 36.9 (01-24-18 @ 05:42)  HR: 84 (01-24-18 @ 12:56) (82 - 110)  BP: 152/77 (01-24-18 @ 12:56) (147/76 - 152/77)  RR: 18 (01-24-18 @ 12:56) (18 - 18)  SpO2: 98% (01-24-18 @ 12:56) (98% - 98%)    MEDICATIONS  (STANDING):  dextrose 5%. 1000 milliLiter(s) (50 mL/Hr) IV Continuous <Continuous>  dextrose 50% Injectable 12.5 Gram(s) IV Push once  dextrose 50% Injectable 25 Gram(s) IV Push once  dextrose 50% Injectable 25 Gram(s) IV Push once  docusate sodium 100 milliGRAM(s) Oral three times a day  insulin lispro (HumaLOG) corrective regimen sliding scale   SubCutaneous three times a day before meals  insulin lispro (HumaLOG) corrective regimen sliding scale   SubCutaneous at bedtime  lactated ringers. 1000 milliLiter(s) (100 mL/Hr) IV Continuous <Continuous>  senna 2 Tablet(s) Oral at bedtime    MEDICATIONS  (PRN):  dextrose Gel 1 Dose(s) Oral once PRN Blood Glucose LESS THAN 70 milliGRAM(s)/deciliter  glucagon  Injectable 1 milliGRAM(s) IntraMuscular once PRN Glucose LESS THAN 70 milligrams/deciliter  morphine  - Injectable 4 milliGRAM(s) IV Push every 6 hours PRN Severe Pain (7 - 10)  ondansetron Injectable 4 milliGRAM(s) IV Push every 6 hours PRN Nausea and/or Vomiting    PHYSICAL EXAM:  GENERAL: NAD, well-developed  HEAD:  Atraumatic, Normocephalic  EYES: +scleral icterus  CHEST/LUNG: Clear to auscultation bilaterally; No wheeze  HEART: Regular rate and rhythm; No murmurs, rubs, or gallops  ABDOMEN: Soft, Nontender, mild distention owel sounds present  EXTREMITIES:  2+ Peripheral Pulses, No clubbing, cyanosis, or edema  PSYCH: AAOx3  NEUROLOGY: non-focal  SKIN: palate and sublingual jaundice,                          10.2   14.40 )-----------( 248      ( 24 Jan 2018 07:45 )             31.6           LIVER FUNCTIONS - ( 24 Jan 2018 07:50 )  Alb: 3.1 g/dL / Pro: 6.9 g/dL / ALK PHOS: 737 U/L / ALT: 250 U/L / AST: 336 U/L / GGT: x           PT/INR - ( 24 Jan 2018 07:45 )   PT: 13.1 sec;   INR: 1.16 ratio         PTT - ( 23 Jan 2018 07:30 )  PTT:35.9 sec  138|100|18<156  3.9|22|1.32  9.8,--,--  01-24 @ 07:50    Ca19-9 791.8   Ca125 2.8  CEA 2.5       RADIOLOGY & ADDITIONAL TESTS:    Imaging Personally Reviewed: CT chesT: IMPRESSION: Redemonstration of a pancreatic head mass with ductal dilatation.No evidence of metastases.  Ct abd/pelvis: Pancreatic head mass with resultant intrahepatic and extrahepatic biliary ductal dilatation, which is grossly unchanged from 1/4/2018. Additional 1.2 cm cystic lesion in body ofpancreas. Pancreatic ductal dilatation, increased since 1/4/2018. Mild inflammatory changes surrounding the pancreatic tail may reflect superimposed acute pancreatitis. Correlate with amylase/lipase.      Consultant(s) Notes Reviewed:  GI    Care Discussed with Consultants/Other Providers:

## 2018-01-24 NOTE — PROGRESS NOTE ADULT - SUBJECTIVE AND OBJECTIVE BOX
SURGERY DAILY PROGRESS NOTE:     Subjective:  Pt seen and examined. Tolerated procedure well yesterday. Has had pain, but controlled with morphine. Says urine is perhaps less dark. Had BM this AM that was an unusual color, he says reddish but not muna blood. Still does not have much appetite.            Objective:    MEDICATIONS  (STANDING):  dextrose 5%. 1000 milliLiter(s) (50 mL/Hr) IV Continuous <Continuous>  dextrose 50% Injectable 12.5 Gram(s) IV Push once  dextrose 50% Injectable 25 Gram(s) IV Push once  dextrose 50% Injectable 25 Gram(s) IV Push once  docusate sodium 100 milliGRAM(s) Oral three times a day  insulin lispro (HumaLOG) corrective regimen sliding scale   SubCutaneous three times a day before meals  insulin lispro (HumaLOG) corrective regimen sliding scale   SubCutaneous at bedtime  lactated ringers. 1000 milliLiter(s) (100 mL/Hr) IV Continuous <Continuous>  senna 2 Tablet(s) Oral at bedtime    MEDICATIONS  (PRN):  dextrose Gel 1 Dose(s) Oral once PRN Blood Glucose LESS THAN 70 milliGRAM(s)/deciliter  glucagon  Injectable 1 milliGRAM(s) IntraMuscular once PRN Glucose LESS THAN 70 milligrams/deciliter  morphine  - Injectable 4 milliGRAM(s) IV Push every 6 hours PRN Severe Pain (7 - 10)  ondansetron Injectable 4 milliGRAM(s) IV Push every 6 hours PRN Nausea and/or Vomiting      Vital Signs Last 24 Hrs  T(C): 36.9 (24 Jan 2018 05:42), Max: 37.6 (23 Jan 2018 21:14)  T(F): 98.4 (24 Jan 2018 05:42), Max: 99.7 (23 Jan 2018 21:14)  HR: 110 (24 Jan 2018 05:42) (80 - 110)  BP: 147/76 (24 Jan 2018 05:42) (129/62 - 163/74)  BP(mean): --  RR: 18 (24 Jan 2018 05:42) (18 - 18)  SpO2: 98% (24 Jan 2018 05:42) (98% - 100%)    I&O's Detail    22 Jan 2018 07:01  -  23 Jan 2018 07:00  --------------------------------------------------------  IN:    Oral Fluid: 1080 mL    sodium chloride 0.9%: 1050 mL  Total IN: 2130 mL    OUT:    Voided: 1 mL  Total OUT: 1 mL    Total NET: 2129 mL      23 Jan 2018 07:01  -  24 Jan 2018 05:55  --------------------------------------------------------  IN:    lactated ringers.: 400 mL    Oral Fluid: 720 mL  Total IN: 1120 mL    OUT:    Voided: 650 mL  Total OUT: 650 mL    Total NET: 470 mL          Daily     Daily     LABS:                        10.0   13.95 )-----------( 253      ( 23 Jan 2018 07:30 )             31.3     01-23    140  |  102  |  17  ----------------------------<  136<H>  4.5   |  21<L>  |  1.39<H>    Ca    9.8      23 Jan 2018 07:39    TPro  6.2  /  Alb  3.1<L>  /  TBili  39.4<H>  /  DBili  x   /  AST  246<H>  /  ALT  219<H>  /  AlkPhos  677<H>  01-22    PT/INR - ( 23 Jan 2018 07:30 )   PT: 13.8 sec;   INR: 1.22 ratio         PTT - ( 23 Jan 2018 07:30 )  PTT:35.9 sec      RADIOLOGY & ADDITIONAL STUDIES:

## 2018-01-24 NOTE — PROGRESS NOTE ADULT - PROBLEM SELECTOR PLAN 1
pancreatic mass in setting of weight loss, abdominal pain, decrease appetite s/p EUS with FNA. Follow up results.   Surgery consult appreciated, recommend out patient follow up.

## 2018-01-24 NOTE — PROGRESS NOTE ADULT - PROBLEM SELECTOR PLAN 3
mild pancreatitis seen on CT with elevated Lipase but exam not suggestive of acute pancreatitis  -c/w IVF for now  -tolerating diet without worsening pain n/v  -appreciate GI recs  D/C planning am tomorrow.

## 2018-01-24 NOTE — PROGRESS NOTE ADULT - PROBLEM SELECTOR PLAN 2
Tbili 39- due to intrahepatic and extrahepatic dilation obstruction from pancreatic mass s/p biliary stents.   Monitor Bilirubin levels.

## 2018-01-25 ENCOUNTER — TRANSCRIPTION ENCOUNTER (OUTPATIENT)
Age: 73
End: 2018-01-25

## 2018-01-25 VITALS
SYSTOLIC BLOOD PRESSURE: 112 MMHG | RESPIRATION RATE: 18 BRPM | DIASTOLIC BLOOD PRESSURE: 57 MMHG | TEMPERATURE: 100 F | OXYGEN SATURATION: 96 % | HEART RATE: 92 BPM

## 2018-01-25 LAB
ALBUMIN SERPL ELPH-MCNC: 2.2 G/DL — LOW (ref 3.3–5)
ALP SERPL-CCNC: 559 U/L — HIGH (ref 40–120)
ALT FLD-CCNC: 183 U/L — HIGH (ref 10–45)
AMYLASE P1 CFR SERPL: 86 U/L — SIGNIFICANT CHANGE UP (ref 25–125)
ANION GAP SERPL CALC-SCNC: 11 MMOL/L — SIGNIFICANT CHANGE UP (ref 5–17)
AST SERPL-CCNC: 241 U/L — HIGH (ref 10–40)
BILIRUB SERPL-MCNC: 18 MG/DL — HIGH (ref 0.2–1.2)
BUN SERPL-MCNC: 17 MG/DL — SIGNIFICANT CHANGE UP (ref 7–23)
CALCIUM SERPL-MCNC: 8.6 MG/DL — SIGNIFICANT CHANGE UP (ref 8.4–10.5)
CHLORIDE SERPL-SCNC: 101 MMOL/L — SIGNIFICANT CHANGE UP (ref 96–108)
CO2 SERPL-SCNC: 24 MMOL/L — SIGNIFICANT CHANGE UP (ref 22–31)
CREAT SERPL-MCNC: 1.2 MG/DL — SIGNIFICANT CHANGE UP (ref 0.5–1.3)
GLUCOSE BLDC GLUCOMTR-MCNC: 144 MG/DL — HIGH (ref 70–99)
GLUCOSE SERPL-MCNC: 148 MG/DL — HIGH (ref 70–99)
HCT VFR BLD CALC: 24.4 % — LOW (ref 39–50)
HGB BLD-MCNC: 8.1 G/DL — LOW (ref 13–17)
LIDOCAIN IGE QN: 215 U/L — HIGH (ref 7–60)
MCHC RBC-ENTMCNC: 27.3 PG — SIGNIFICANT CHANGE UP (ref 27–34)
MCHC RBC-ENTMCNC: 33.2 GM/DL — SIGNIFICANT CHANGE UP (ref 32–36)
MCV RBC AUTO: 82.2 FL — SIGNIFICANT CHANGE UP (ref 80–100)
PLATELET # BLD AUTO: 197 K/UL — SIGNIFICANT CHANGE UP (ref 150–400)
POTASSIUM SERPL-MCNC: 4 MMOL/L — SIGNIFICANT CHANGE UP (ref 3.5–5.3)
POTASSIUM SERPL-SCNC: 4 MMOL/L — SIGNIFICANT CHANGE UP (ref 3.5–5.3)
PROT SERPL-MCNC: 5.3 G/DL — LOW (ref 6–8.3)
RBC # BLD: 2.97 M/UL — LOW (ref 4.2–5.8)
RBC # FLD: 18.1 % — HIGH (ref 10.3–14.5)
SODIUM SERPL-SCNC: 136 MMOL/L — SIGNIFICANT CHANGE UP (ref 135–145)
WBC # BLD: 11.49 K/UL — HIGH (ref 3.8–10.5)
WBC # FLD AUTO: 11.49 K/UL — HIGH (ref 3.8–10.5)

## 2018-01-25 PROCEDURE — 80048 BASIC METABOLIC PNL TOTAL CA: CPT

## 2018-01-25 PROCEDURE — 85379 FIBRIN DEGRADATION QUANT: CPT

## 2018-01-25 PROCEDURE — 96374 THER/PROPH/DIAG INJ IV PUSH: CPT | Mod: XU

## 2018-01-25 PROCEDURE — 74177 CT ABD & PELVIS W/CONTRAST: CPT

## 2018-01-25 PROCEDURE — 86301 IMMUNOASSAY TUMOR CA 19-9: CPT

## 2018-01-25 PROCEDURE — 81001 URINALYSIS AUTO W/SCOPE: CPT

## 2018-01-25 PROCEDURE — C1874: CPT

## 2018-01-25 PROCEDURE — 71250 CT THORAX DX C-: CPT

## 2018-01-25 PROCEDURE — 96375 TX/PRO/DX INJ NEW DRUG ADDON: CPT

## 2018-01-25 PROCEDURE — C1769: CPT

## 2018-01-25 PROCEDURE — 85027 COMPLETE CBC AUTOMATED: CPT

## 2018-01-25 PROCEDURE — 80053 COMPREHEN METABOLIC PANEL: CPT

## 2018-01-25 PROCEDURE — 82803 BLOOD GASES ANY COMBINATION: CPT

## 2018-01-25 PROCEDURE — 85610 PROTHROMBIN TIME: CPT

## 2018-01-25 PROCEDURE — 99239 HOSP IP/OBS DSCHRG MGMT >30: CPT

## 2018-01-25 PROCEDURE — 76705 ECHO EXAM OF ABDOMEN: CPT

## 2018-01-25 PROCEDURE — 85384 FIBRINOGEN ACTIVITY: CPT

## 2018-01-25 PROCEDURE — 83036 HEMOGLOBIN GLYCOSYLATED A1C: CPT

## 2018-01-25 PROCEDURE — 86304 IMMUNOASSAY TUMOR CA 125: CPT

## 2018-01-25 PROCEDURE — 88307 TISSUE EXAM BY PATHOLOGIST: CPT

## 2018-01-25 PROCEDURE — 84484 ASSAY OF TROPONIN QUANT: CPT

## 2018-01-25 PROCEDURE — 82105 ALPHA-FETOPROTEIN SERUM: CPT

## 2018-01-25 PROCEDURE — 93005 ELECTROCARDIOGRAM TRACING: CPT

## 2018-01-25 PROCEDURE — 84154 ASSAY OF PSA FREE: CPT

## 2018-01-25 PROCEDURE — C2617: CPT

## 2018-01-25 PROCEDURE — 85730 THROMBOPLASTIN TIME PARTIAL: CPT

## 2018-01-25 PROCEDURE — 84153 ASSAY OF PSA TOTAL: CPT

## 2018-01-25 PROCEDURE — 82962 GLUCOSE BLOOD TEST: CPT

## 2018-01-25 PROCEDURE — 82150 ASSAY OF AMYLASE: CPT

## 2018-01-25 PROCEDURE — 82378 CARCINOEMBRYONIC ANTIGEN: CPT

## 2018-01-25 PROCEDURE — 99285 EMERGENCY DEPT VISIT HI MDM: CPT | Mod: 25

## 2018-01-25 PROCEDURE — 83690 ASSAY OF LIPASE: CPT

## 2018-01-25 RX ORDER — DOCUSATE SODIUM 100 MG
1 CAPSULE ORAL
Qty: 0 | Refills: 0 | COMMUNITY
Start: 2018-01-25

## 2018-01-25 RX ORDER — OXYCODONE HYDROCHLORIDE 5 MG/1
1 TABLET ORAL
Qty: 20 | Refills: 0 | OUTPATIENT
Start: 2018-01-25 | End: 2018-01-29

## 2018-01-25 RX ORDER — SENNA PLUS 8.6 MG/1
2 TABLET ORAL
Qty: 0 | Refills: 0 | COMMUNITY
Start: 2018-01-25

## 2018-01-25 RX ADMIN — Medication 100 MILLIGRAM(S): at 05:42

## 2018-01-25 NOTE — DISCHARGE NOTE ADULT - PLAN OF CARE
surgical f/u oncology f/u s/p ERCP with stents; f/u with surgical oncology next tuesday - call for appointment; f/u with oncology - call for appointment avoid eating concentrated sweets; monitor BS and A1C with PMD

## 2018-01-25 NOTE — DISCHARGE NOTE ADULT - PATIENT PORTAL LINK FT
“You can access the [facility] Patient Portal, offered by [facility], by registering with the following website: [email or web address]”

## 2018-01-25 NOTE — PROGRESS NOTE ADULT - PROBLEM SELECTOR PLAN 1
- pathology shows pancreatic adenoCa  - consult medical and surgical oncology - pathology shows pancreatic adenoCa  - patient informed of his diagnosis and questions answered  - EUS shows absence of vascular involvement  - please consult medical and surgical oncology

## 2018-01-25 NOTE — DISCHARGE NOTE ADULT - MEDICATION SUMMARY - MEDICATIONS TO TAKE
I will START or STAY ON the medications listed below when I get home from the hospital:    oxyCODONE 5 mg oral tablet  -- 1 tab(s) by mouth every 6 hours, As Needed -for severe pain MDD:4  -- Caution federal law prohibits the transfer of this drug to any person other  than the person for whom it was prescribed.  It is very important that you take or use this exactly as directed.  Do not skip doses or discontinue unless directed by your doctor.  May cause drowsiness.  Alcohol may intensify this effect.  Use care when operating dangerous machinery.  This prescription cannot be refilled.  Using more of this medication than prescribed may cause serious breathing problems.    -- Indication: For Pain    docusate sodium 100 mg oral capsule  -- 1 cap(s) by mouth 3 times a day  -- Indication: For stool softener    senna oral tablet  -- 2 tab(s) by mouth once a day (at bedtime)  -- Indication: For stool softener

## 2018-01-25 NOTE — PROGRESS NOTE ADULT - PROVIDER SPECIALTY LIST ADULT
Gastroenterology
Hospitalist
Surgery
Hospitalist

## 2018-01-25 NOTE — PROGRESS NOTE ADULT - PROBLEM SELECTOR PLAN 3
mild pancreatitis seen on CT with elevated Lipase but exam not suggestive of acute pancreatitis  -tolerating diet without worsening pain n/v  D/C planning today.

## 2018-01-25 NOTE — DISCHARGE NOTE ADULT - ADDITIONAL INSTRUCTIONS
do not take tylenol or any products containing tylenolf  f/u with Oncology [facility] [phone]- call for appointment and inform  that you were just discharged from the hospital and were seen by Dr Ferguson

## 2018-01-25 NOTE — PROGRESS NOTE ADULT - PROBLEM SELECTOR PLAN 6
FS controlled  -c/w RICHARD   HbA1C 4.8.
FS controlled  -c/w RICHARD   -monitor FS  -send A1c
FS controlled  -c/w RICHARD   HbA1C 4.8.
FS controlled  -c/w RICHARD   -monitor FS  -send A1c

## 2018-01-25 NOTE — PROGRESS NOTE ADULT - PROBLEM SELECTOR PLAN 2
Tbili 39- due to intrahepatic and extrahepatic dilation obstruction from pancreatic mass s/p biliary stents.   resolving,
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
## 2018-01-25 NOTE — PROGRESS NOTE ADULT - PROBLEM SELECTOR PLAN 8
Venodyne boots
Venodyne boots
manuel as may get intervention loc with elevated INR
manuel as may get intervention loc with elevated INR

## 2018-01-25 NOTE — PROGRESS NOTE ADULT - PROBLEM SELECTOR PLAN 1
pancreatic mass in setting of weight loss, abdominal pain, decrease appetite s/p EUS with FNA. Follow up results.   Surgery Onc consult appreciated, recommend out patient follow up.

## 2018-01-25 NOTE — PROGRESS NOTE ADULT - ASSESSMENT
71yo M w/pmhx htn (no longer on meds), dm2 (no longer on meds), and BPH as well as prostate cancer s/p prostatectomy now presenting with jaundice, weight loss, abdominal pain with tbili of 39 found to have obstructive pancreatic mass and biliary system dilatation suspicious for pancreatic CA
71yo M w/pmhx htn (no longer on meds), dm2 (no longer on meds), and BPH as well as prostate cancer s/p prostatectomy now presenting with jaundice, weight loss, abdominal pain with tbili of 39 found to have obstructive pancreatic mass and biliary system dilatation suspicious for pancreatic CA
72M w/ obstructing pancreatic head mass s/p EUS/ERCP w/ core biopsy, CBD and panc duct stent placements and sphincterotomy    - f/u pathology  - tumor markers appreciated  - trend LFTs  - patient should follow with Dr. Jones for surgical planning for a few weeks from now if he is to be d/c'd, if he is inpatient will continue to follow    PARTH Mccarthy MD  [id number]
73 yo man with obstructive jaundice, found to have pancreatic adenoCa. Patient is day 2 s/p EUS/FNA and ERCP with stent placement.
73 yo man with obstructive jaundice, found to have pancreatic head mass causing double duct sign. Patient is day s/p EUS/FNA and ERCP with stent placement.
73yo M w/pmhx htn (no longer on meds), dm2 (no longer on meds), and BPH as well as prostate cancer s/p prostatectomy now presenting with jaundice, weight loss, abdominal pain with tbili of 39 found to have obstructive pancreatic mass and biliary system dilatation suspicious for pancreatic CA
73yo M w/pmhx htn (no longer on meds), dm2 (no longer on meds), and BPH as well as prostate cancer s/p prostatectomy now presenting with jaundice, weight loss, abdominal pain with tbili of 39 found to have obstructive pancreatic mass and biliary system dilatation suspicious for pancreatic CA

## 2018-01-25 NOTE — DISCHARGE NOTE ADULT - CARE PROVIDERS DIRECT ADDRESSES
[email or web address]

## 2018-01-25 NOTE — DISCHARGE NOTE ADULT - HOSPITAL COURSE
73yo M w/pmhx htn (no longer on meds), dm2 (no longer on meds), and BPH as well as prostate cancer s/p prostatectomy now presenting with jaundice, weight loss, abdominal pain with tbili of 39 found to have obstructive pancreatic mass and biliary system dilatation suspicious for pancreatic CA; pt underwent ERCP with 2 stents and diagnosed with adenocarcinoma of pancreas; pt will need surgical f/u for probable Whipple and oncology f/u for chemotherapy. 71yo M w/pmhx htn (no longer on meds), dm2 (no longer on meds), and BPH as well as prostate cancer s/p prostatectomy now presenting with jaundice, weight loss, abdominal pain with tbili of 39 found to have obstructive pancreatic mass and biliary system dilatation suspicious for pancreatic CA; pt underwent ERCP with 2 stents and diagnosed with adenocarcinoma of pancreas; pt will need surgical f/u for probable Whipple and oncology f/u for chemotherapy.      Discharge Diagnosis    Obstructive Jaundice   Pancreatic adenocarcinoma s/p ERCP  s/p ERCP with duodenal stents  HTN

## 2018-01-25 NOTE — PROGRESS NOTE ADULT - PROBLEM SELECTOR PLAN 4
Likely from dehydration, monitor renal function.   Avoid NSAIDS.
INR 1.7 likely due to vitamin K defied due to lack of bile acids allowing for absorption of fat soluble vitamins and poor po intake  -will give IV vitamin K 5mg x 1 today (explained R/B/A with patient who agreed for medication)  -DIC labs are negative  -f/u  INR in AM

## 2018-01-25 NOTE — DISCHARGE NOTE ADULT - CARE PROVIDER_API CALL
Cory Jones), Surgery; Surgical Oncology  [address] [place] [address]  Phone: [phone]  Fax: [phone]

## 2018-01-25 NOTE — PROGRESS NOTE ADULT - SUBJECTIVE AND OBJECTIVE BOX
Patient is a 72y old  Male who presents with a chief complaint of Jaundice, RUQ pain, pancreatic mass and possible pancreatitis (22 Jan 2018 07:14)      SUBJECTIVE / OVERNIGHT EVENTS: Patient feels much improved, tolerating diet. No overnight events.   ROS otherwise negative.     T(C): 37.6 (01-25-18 @ 04:56), Max: 37.6 (01-25-18 @ 04:56)  HR: 92 (01-25-18 @ 04:56) (92 - 92)  BP: 112/57 (01-25-18 @ 04:56) (112/57 - 112/57)  RR: 18 (01-25-18 @ 04:56) (18 - 18)  SpO2: 96% (01-25-18 @ 04:56) (96% - 96%)  MEDICATIONS  (STANDING):  dextrose 5%. 1000 milliLiter(s) (50 mL/Hr) IV Continuous <Continuous>  dextrose 50% Injectable 12.5 Gram(s) IV Push once  dextrose 50% Injectable 25 Gram(s) IV Push once  dextrose 50% Injectable 25 Gram(s) IV Push once  docusate sodium 100 milliGRAM(s) Oral three times a day  insulin lispro (HumaLOG) corrective regimen sliding scale   SubCutaneous three times a day before meals  insulin lispro (HumaLOG) corrective regimen sliding scale   SubCutaneous at bedtime  senna 2 Tablet(s) Oral at bedtime    MEDICATIONS  (PRN):  dextrose Gel 1 Dose(s) Oral once PRN Blood Glucose LESS THAN 70 milliGRAM(s)/deciliter  glucagon  Injectable 1 milliGRAM(s) IntraMuscular once PRN Glucose LESS THAN 70 milligrams/deciliter  morphine  - Injectable 4 milliGRAM(s) IV Push every 6 hours PRN Severe Pain (7 - 10)  ondansetron Injectable 4 milliGRAM(s) IV Push every 6 hours PRN Nausea and/or Vomiting    PHYSICAL EXAM:  GENERAL: NAD, well-developed  HEAD:  Atraumatic, Normocephalic  EYES: +scleral icterus  CHEST/LUNG: Clear to auscultation bilaterally; No wheeze  HEART: Regular rate and rhythm; No murmurs, rubs, or gallops  ABDOMEN: Soft, Nontender, mild distention owel sounds present  EXTREMITIES:  2+ Peripheral Pulses, No clubbing, cyanosis, or edema  PSYCH: AAOx3  NEUROLOGY: non-focal  SKIN: palate and sublingual jaundice,                          8.1    11.49 )-----------( 197      ( 25 Jan 2018 07:54 )             24.4           LIVER FUNCTIONS - ( 25 Jan 2018 07:44 )  Alb: 2.2 g/dL / Pro: 5.3 g/dL / ALK PHOS: 559 U/L / ALT: 183 U/L / AST: 241 U/L / GGT: x           PT/INR - ( 24 Jan 2018 07:45 )   PT: 13.1 sec;   INR: 1.16 ratio           136|101|17<148  4.0|24|1.20  8.6,--,--  01-25 @ 07:44        RADIOLOGY & ADDITIONAL TESTS:    Imaging Personally Reviewed: CT chesT: IMPRESSION: Redemonstration of a pancreatic head mass with ductal dilatation.No evidence of metastases.  Ct abd/pelvis: Pancreatic head mass with resultant intrahepatic and extrahepatic biliary ductal dilatation, which is grossly unchanged from 1/4/2018. Additional 1.2 cm cystic lesion in body ofpancreas. Pancreatic ductal dilatation, increased since 1/4/2018. Mild inflammatory changes surrounding the pancreatic tail may reflect superimposed acute pancreatitis. Correlate with amylase/lipase.      Consultant(s) Notes Reviewed:  GI, Surg Onc     Care Discussed with Consultants/Other Providers:

## 2018-02-27 ENCOUNTER — OUTPATIENT (OUTPATIENT)
Dept: OUTPATIENT SERVICES | Facility: HOSPITAL | Age: 73
LOS: 1 days | End: 2018-02-27
Payer: MEDICARE

## 2018-02-27 VITALS
WEIGHT: 154.1 LBS | RESPIRATION RATE: 16 BRPM | TEMPERATURE: 97 F | HEART RATE: 55 BPM | SYSTOLIC BLOOD PRESSURE: 152 MMHG | HEIGHT: 70.5 IN | DIASTOLIC BLOOD PRESSURE: 80 MMHG

## 2018-02-27 DIAGNOSIS — K86.9 DISEASE OF PANCREAS, UNSPECIFIED: ICD-10-CM

## 2018-02-27 DIAGNOSIS — I10 ESSENTIAL (PRIMARY) HYPERTENSION: ICD-10-CM

## 2018-02-27 DIAGNOSIS — Z98.890 OTHER SPECIFIED POSTPROCEDURAL STATES: Chronic | ICD-10-CM

## 2018-02-27 DIAGNOSIS — Z98.49 CATARACT EXTRACTION STATUS, UNSPECIFIED EYE: Chronic | ICD-10-CM

## 2018-02-27 DIAGNOSIS — R06.83 SNORING: ICD-10-CM

## 2018-02-27 DIAGNOSIS — E11.9 TYPE 2 DIABETES MELLITUS WITHOUT COMPLICATIONS: ICD-10-CM

## 2018-02-27 DIAGNOSIS — K83.1 OBSTRUCTION OF BILE DUCT: Chronic | ICD-10-CM

## 2018-02-27 LAB
ALBUMIN SERPL ELPH-MCNC: 3.9 G/DL — SIGNIFICANT CHANGE UP (ref 3.3–5)
ALP SERPL-CCNC: 97 U/L — SIGNIFICANT CHANGE UP (ref 40–120)
ALT FLD-CCNC: 15 U/L — SIGNIFICANT CHANGE UP (ref 4–41)
APTT BLD: 34.2 SEC — SIGNIFICANT CHANGE UP (ref 27.5–37.4)
AST SERPL-CCNC: 22 U/L — SIGNIFICANT CHANGE UP (ref 4–40)
BILIRUB SERPL-MCNC: 3.5 MG/DL — HIGH (ref 0.2–1.2)
BLD GP AB SCN SERPL QL: NEGATIVE — SIGNIFICANT CHANGE UP
BUN SERPL-MCNC: 9 MG/DL — SIGNIFICANT CHANGE UP (ref 7–23)
CALCIUM SERPL-MCNC: 9.6 MG/DL — SIGNIFICANT CHANGE UP (ref 8.4–10.5)
CHLORIDE SERPL-SCNC: 99 MMOL/L — SIGNIFICANT CHANGE UP (ref 98–107)
CO2 SERPL-SCNC: 29 MMOL/L — SIGNIFICANT CHANGE UP (ref 22–31)
CREAT SERPL-MCNC: 0.96 MG/DL — SIGNIFICANT CHANGE UP (ref 0.5–1.3)
GLUCOSE SERPL-MCNC: 128 MG/DL — HIGH (ref 70–99)
HCT VFR BLD CALC: 40.3 % — SIGNIFICANT CHANGE UP (ref 39–50)
HGB BLD-MCNC: 12.7 G/DL — LOW (ref 13–17)
INR BLD: 1.08 — SIGNIFICANT CHANGE UP (ref 0.88–1.17)
MCHC RBC-ENTMCNC: 28.3 PG — SIGNIFICANT CHANGE UP (ref 27–34)
MCHC RBC-ENTMCNC: 31.5 % — LOW (ref 32–36)
MCV RBC AUTO: 89.8 FL — SIGNIFICANT CHANGE UP (ref 80–100)
NRBC # FLD: 0 — SIGNIFICANT CHANGE UP
PLATELET # BLD AUTO: 202 K/UL — SIGNIFICANT CHANGE UP (ref 150–400)
PMV BLD: 12.4 FL — SIGNIFICANT CHANGE UP (ref 7–13)
POTASSIUM SERPL-MCNC: 3.8 MMOL/L — SIGNIFICANT CHANGE UP (ref 3.5–5.3)
POTASSIUM SERPL-SCNC: 3.8 MMOL/L — SIGNIFICANT CHANGE UP (ref 3.5–5.3)
PROT SERPL-MCNC: 7.4 G/DL — SIGNIFICANT CHANGE UP (ref 6–8.3)
PROTHROM AB SERPL-ACNC: 12 SEC — SIGNIFICANT CHANGE UP (ref 9.8–13.1)
RBC # BLD: 4.49 M/UL — SIGNIFICANT CHANGE UP (ref 4.2–5.8)
RBC # FLD: 13 % — SIGNIFICANT CHANGE UP (ref 10.3–14.5)
RH IG SCN BLD-IMP: POSITIVE — SIGNIFICANT CHANGE UP
SODIUM SERPL-SCNC: 140 MMOL/L — SIGNIFICANT CHANGE UP (ref 135–145)
WBC # BLD: 6.74 K/UL — SIGNIFICANT CHANGE UP (ref 3.8–10.5)
WBC # FLD AUTO: 6.74 K/UL — SIGNIFICANT CHANGE UP (ref 3.8–10.5)

## 2018-02-27 PROCEDURE — 93010 ELECTROCARDIOGRAM REPORT: CPT

## 2018-02-27 RX ORDER — SODIUM CHLORIDE 9 MG/ML
1000 INJECTION, SOLUTION INTRAVENOUS
Qty: 0 | Refills: 0 | Status: DISCONTINUED | OUTPATIENT
Start: 2018-03-07 | End: 2018-03-07
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
## 2018-02-27 NOTE — H&P PST ADULT - HISTORY OF PRESENT ILLNESS
72 year old male with c/o jaundice and loss of appetite since 12/2017, pt had workup with imaging and biopsy - diagnosed with pancreatic cancer. Pt presents today for presurgical evaluation for ... 72 year old male with c/o jaundice and loss of appetite since 12/2017, pt had workup with imaging and biopsy - diagnosed with pancreatic cancer. Pt had stents placed to pancreatic duct and common biliary duct last month. Pt presents today for presurgical evaluation for Whipple, Feeding Jejunostomy Tube scheduled on 3/7/18.

## 2018-02-27 NOTE — H&P PST ADULT - FAMILY HISTORY
Sibling  Still living? Unknown  Diabetes mellitus, Age at diagnosis: Age Unknown  Family history of sickle cell anemia, Age at diagnosis: Age Unknown

## 2018-02-27 NOTE — H&P PST ADULT - PROBLEM SELECTOR PLAN 1
Rosarioipprahda, Feeding Jejunostomy Tube scheduled on 3/7/18.  Pre-op instructions provided. Pt verbalized understanding.   Pepcid provided for GI prophylaxis.   Chlorhexidine wash provided and instructions given.

## 2018-02-27 NOTE — H&P PST ADULT - PMH
DM (Diabetes Mellitus) '  2009    Glaucoma    HTN (Hypertension)' 2009    Prostate cancer  2010 DM (Diabetes Mellitus) '  2009    Glaucoma    HTN (Hypertension)' 2009    Malignant neoplasm of prostate    Prostate cancer  2010

## 2018-02-27 NOTE — H&P PST ADULT - NSANTHOSAYNRD_GEN_A_CORE
No. REVA screening performed.  STOP BANG Legend: 0-2 = LOW Risk; 3-4 = INTERMEDIATE Risk; 5-8 = HIGH Risk

## 2018-02-27 NOTE — H&P PST ADULT - PROBLEM SELECTOR PLAN 3
BP mildly elevated at PST. Pt states he went for medical clearance - requested copy. Pt instructed to continue medication as prescribed.

## 2018-02-27 NOTE — H&P PST ADULT - MUSCULOSKELETAL
details… detailed exam no calf tenderness/no joint swelling/no joint erythema/no joint warmth/normal strength/ROM intact

## 2018-02-27 NOTE — H&P PST ADULT - ATTENDING COMMENTS
Risks, benefits, and alternatives discussed with patient.  He expressed understanding and agrees to proceed with Whipple procedure, feeding jejunostomy.

## 2018-03-06 NOTE — ASU PATIENT PROFILE, ADULT - PMH
DM (Diabetes Mellitus) '  2009    Glaucoma    HTN (Hypertension)' 2009    Malignant neoplasm of prostate    Prostate cancer  2010

## 2018-03-07 ENCOUNTER — APPOINTMENT (OUTPATIENT)
Dept: SURGICAL ONCOLOGY | Facility: HOSPITAL | Age: 73
End: 2018-03-07

## 2018-03-07 ENCOUNTER — RESULT REVIEW (OUTPATIENT)
Age: 73
End: 2018-03-07

## 2018-03-07 ENCOUNTER — INPATIENT (INPATIENT)
Facility: HOSPITAL | Age: 73
LOS: 1 days | Discharge: HOME HEALTH | End: 2018-03-09
Attending: SURGERY | Admitting: SURGERY
Payer: MEDICARE

## 2018-03-07 VITALS
OXYGEN SATURATION: 98 % | RESPIRATION RATE: 15 BRPM | WEIGHT: 154.1 LBS | SYSTOLIC BLOOD PRESSURE: 146 MMHG | HEIGHT: 70.5 IN | DIASTOLIC BLOOD PRESSURE: 62 MMHG | HEART RATE: 69 BPM | TEMPERATURE: 98 F

## 2018-03-07 DIAGNOSIS — K83.1 OBSTRUCTION OF BILE DUCT: Chronic | ICD-10-CM

## 2018-03-07 DIAGNOSIS — Z98.49 CATARACT EXTRACTION STATUS, UNSPECIFIED EYE: Chronic | ICD-10-CM

## 2018-03-07 DIAGNOSIS — Z98.890 OTHER SPECIFIED POSTPROCEDURAL STATES: Chronic | ICD-10-CM

## 2018-03-07 DIAGNOSIS — K86.9 DISEASE OF PANCREAS, UNSPECIFIED: ICD-10-CM

## 2018-03-07 LAB — RH IG SCN BLD-IMP: POSITIVE — SIGNIFICANT CHANGE UP

## 2018-03-07 PROCEDURE — 49560: CPT

## 2018-03-07 PROCEDURE — 88331 PATH CONSLTJ SURG 1 BLK 1SPC: CPT | Mod: 26

## 2018-03-07 PROCEDURE — 88307 TISSUE EXAM BY PATHOLOGIST: CPT | Mod: 26

## 2018-03-07 PROCEDURE — 47100 WEDGE BIOPSY OF LIVER: CPT

## 2018-03-07 PROCEDURE — 88341 IMHCHEM/IMCYTCHM EA ADD ANTB: CPT | Mod: 26

## 2018-03-07 PROCEDURE — 88302 TISSUE EXAM BY PATHOLOGIST: CPT | Mod: 26

## 2018-03-07 PROCEDURE — 88342 IMHCHEM/IMCYTCHM 1ST ANTB: CPT | Mod: 26

## 2018-03-07 PROCEDURE — 44050 REDUCE BOWEL OBSTRUCTION: CPT | Mod: 59

## 2018-03-07 RX ORDER — DEXTROSE 50 % IN WATER 50 %
25 SYRINGE (ML) INTRAVENOUS ONCE
Qty: 0 | Refills: 0 | Status: DISCONTINUED | OUTPATIENT
Start: 2018-03-07 | End: 2018-03-09

## 2018-03-07 RX ORDER — INSULIN LISPRO 100/ML
VIAL (ML) SUBCUTANEOUS
Qty: 0 | Refills: 0 | Status: DISCONTINUED | OUTPATIENT
Start: 2018-03-07 | End: 2018-03-09

## 2018-03-07 RX ORDER — HYDROMORPHONE HYDROCHLORIDE 2 MG/ML
0.25 INJECTION INTRAMUSCULAR; INTRAVENOUS; SUBCUTANEOUS
Qty: 0 | Refills: 0 | Status: DISCONTINUED | OUTPATIENT
Start: 2018-03-07 | End: 2018-03-07

## 2018-03-07 RX ORDER — SODIUM CHLORIDE 9 MG/ML
1000 INJECTION, SOLUTION INTRAVENOUS
Qty: 0 | Refills: 0 | Status: DISCONTINUED | OUTPATIENT
Start: 2018-03-07 | End: 2018-03-09

## 2018-03-07 RX ORDER — DIPHENHYDRAMINE HCL 50 MG
25 CAPSULE ORAL ONCE
Qty: 0 | Refills: 0 | Status: COMPLETED | OUTPATIENT
Start: 2018-03-07 | End: 2018-03-08

## 2018-03-07 RX ORDER — FENTANYL CITRATE 50 UG/ML
50 INJECTION INTRAVENOUS
Qty: 0 | Refills: 0 | Status: DISCONTINUED | OUTPATIENT
Start: 2018-03-07 | End: 2018-03-07

## 2018-03-07 RX ORDER — DEXTROSE 50 % IN WATER 50 %
12.5 SYRINGE (ML) INTRAVENOUS ONCE
Qty: 0 | Refills: 0 | Status: DISCONTINUED | OUTPATIENT
Start: 2018-03-07 | End: 2018-03-09

## 2018-03-07 RX ORDER — LISINOPRIL 2.5 MG/1
5 TABLET ORAL DAILY
Qty: 0 | Refills: 0 | Status: DISCONTINUED | OUTPATIENT
Start: 2018-03-07 | End: 2018-03-09

## 2018-03-07 RX ORDER — GLUCAGON INJECTION, SOLUTION 0.5 MG/.1ML
1 INJECTION, SOLUTION SUBCUTANEOUS ONCE
Qty: 0 | Refills: 0 | Status: DISCONTINUED | OUTPATIENT
Start: 2018-03-07 | End: 2018-03-09

## 2018-03-07 RX ORDER — DEXTROSE 50 % IN WATER 50 %
1 SYRINGE (ML) INTRAVENOUS ONCE
Qty: 0 | Refills: 0 | Status: DISCONTINUED | OUTPATIENT
Start: 2018-03-07 | End: 2018-03-09

## 2018-03-07 RX ORDER — NALOXONE HYDROCHLORIDE 4 MG/.1ML
0.1 SPRAY NASAL
Qty: 0 | Refills: 0 | Status: DISCONTINUED | OUTPATIENT
Start: 2018-03-07 | End: 2018-03-08

## 2018-03-07 RX ORDER — AMLODIPINE BESYLATE 2.5 MG/1
2.5 TABLET ORAL DAILY
Qty: 0 | Refills: 0 | Status: DISCONTINUED | OUTPATIENT
Start: 2018-03-07 | End: 2018-03-09

## 2018-03-07 RX ORDER — ONDANSETRON 8 MG/1
4 TABLET, FILM COATED ORAL EVERY 6 HOURS
Qty: 0 | Refills: 0 | Status: DISCONTINUED | OUTPATIENT
Start: 2018-03-07 | End: 2018-03-08

## 2018-03-07 RX ORDER — LATANOPROST 0.05 MG/ML
1 SOLUTION/ DROPS OPHTHALMIC; TOPICAL AT BEDTIME
Qty: 0 | Refills: 0 | Status: DISCONTINUED | OUTPATIENT
Start: 2018-03-07 | End: 2018-03-09

## 2018-03-07 RX ORDER — HEPARIN SODIUM 5000 [USP'U]/ML
5000 INJECTION INTRAVENOUS; SUBCUTANEOUS EVERY 8 HOURS
Qty: 0 | Refills: 0 | Status: DISCONTINUED | OUTPATIENT
Start: 2018-03-07 | End: 2018-03-09

## 2018-03-07 RX ORDER — ONDANSETRON 8 MG/1
4 TABLET, FILM COATED ORAL ONCE
Qty: 0 | Refills: 0 | Status: DISCONTINUED | OUTPATIENT
Start: 2018-03-07 | End: 2018-03-07

## 2018-03-07 RX ADMIN — SODIUM CHLORIDE 125 MILLILITER(S): 9 INJECTION, SOLUTION INTRAVENOUS at 09:50

## 2018-03-07 RX ADMIN — HYDROMORPHONE HYDROCHLORIDE 0.25 MILLIGRAM(S): 2 INJECTION INTRAMUSCULAR; INTRAVENOUS; SUBCUTANEOUS at 11:00

## 2018-03-07 RX ADMIN — HYDROMORPHONE HYDROCHLORIDE 0.25 MILLIGRAM(S): 2 INJECTION INTRAMUSCULAR; INTRAVENOUS; SUBCUTANEOUS at 11:15

## 2018-03-07 RX ADMIN — AMLODIPINE BESYLATE 2.5 MILLIGRAM(S): 2.5 TABLET ORAL at 18:27

## 2018-03-07 RX ADMIN — LATANOPROST 1 DROP(S): 0.05 SOLUTION/ DROPS OPHTHALMIC; TOPICAL at 22:45

## 2018-03-07 RX ADMIN — HEPARIN SODIUM 5000 UNIT(S): 5000 INJECTION INTRAVENOUS; SUBCUTANEOUS at 23:38

## 2018-03-07 RX ADMIN — HEPARIN SODIUM 5000 UNIT(S): 5000 INJECTION INTRAVENOUS; SUBCUTANEOUS at 15:42

## 2018-03-07 RX ADMIN — LISINOPRIL 5 MILLIGRAM(S): 2.5 TABLET ORAL at 18:27

## 2018-03-07 RX ADMIN — SODIUM CHLORIDE 125 MILLILITER(S): 9 INJECTION, SOLUTION INTRAVENOUS at 18:24

## 2018-03-07 NOTE — BRIEF OPERATIVE NOTE - OPERATION/FINDINGS
Numerous liver lesions. Segment 4 liver lesion biopsied and positive for adenocarcinoma. Planned whipple aborted.

## 2018-03-07 NOTE — CHART NOTE - NSCHARTNOTEFT_GEN_A_CORE
POST-OPERATION NOTE  STATUS POST:   Exploratory laparotomy, lysis of adhesions, repair of ventral hernia, liver biopsy, aborted Whipple procedure    SUBJECTIVE:   Patient was seen and examined in the PACU at 13:30 PM. There was no acute event postoperatively. He is status post ex lap, FLAKITO, and aborted whipple procedure, and tolerated operation well. He is resting comfortably in bed. Pain is well controlled. He does not report pain, fever, n/v, chest pain, or shortness of breath. he has a Sy. Patient does not have flatus or bowel movement.     SOB:  [ ] YES [x ] NO  Chest Discomfort: [ ] YES [x ] NO    Nausea: [ ] YES [ x] NO           Vomiting: [ ] YES [ x] NO  Flatus: [ ] YES [x ] NO             Bowel Movement: [ ] YES [ x] NO  Sy: [x ] YES [ ] NO  NGT: [ ] YES [ x] NO     Vital Signs Last 24 Hrs  T(C): 37.2 (07 Mar 2018 16:00), Max: 37.2 (07 Mar 2018 16:00)  T(F): 99 (07 Mar 2018 16:00), Max: 99 (07 Mar 2018 16:00)  HR: 97 (07 Mar 2018 16:00) (60 - 97)  BP: 143/84 (07 Mar 2018 16:00) (125/64 - 146/62)  BP(mean): --  RR: 14 (07 Mar 2018 16:00) (12 - 19)  SpO2: 99% (07 Mar 2018 16:00) (94% - 100%)  I&O's Summary    07 Mar 2018 07:01  -  07 Mar 2018 16:11  --------------------------------------------------------  IN: 995 mL / OUT: 1120 mL / NET: -125 mL      I&O's Detail    07 Mar 2018 07:01  -  07 Mar 2018 16:11  --------------------------------------------------------  IN:    lactated ringers.: 875 mL    Oral Fluid: 120 mL  Total IN: 995 mL    OUT:    Indwelling Catheter - Urethral: 1120 mL  Total OUT: 1120 mL    Total NET: -125 mL          MEDICATIONS  (STANDING):  amLODIPine   Tablet 2.5 milliGRAM(s) Oral daily  dextrose 5%. 1000 milliLiter(s) (50 mL/Hr) IV Continuous <Continuous>  dextrose 50% Injectable 12.5 Gram(s) IV Push once  dextrose 50% Injectable 25 Gram(s) IV Push once  dextrose 50% Injectable 25 Gram(s) IV Push once  heparin  Injectable 5000 Unit(s) SubCutaneous every 8 hours  HYDROmorphone (10 MICROgram(s)/mL) + BUpivacaine 0.0625% in 0.9% Sodium Chloride PCEA 250 milliLiter(s) Epidural PCA Continuous  insulin lispro (HumaLOG) corrective regimen sliding scale   SubCutaneous three times a day before meals  lactated ringers. 1000 milliLiter(s) (125 mL/Hr) IV Continuous <Continuous>  latanoprost 0.005% Ophthalmic Solution 1 Drop(s) Both EYES at bedtime  lisinopril 5 milliGRAM(s) Oral daily    MEDICATIONS  (PRN):  dextrose Gel 1 Dose(s) Oral once PRN Blood Glucose LESS THAN 70 milliGRAM(s)/deciliter  fentaNYL    Injectable 50 MICROGram(s) IV Push every 5 minutes PRN Moderate Pain (4 - 6)  glucagon  Injectable 1 milliGRAM(s) IntraMuscular once PRN Glucose LESS THAN 70 milligrams/deciliter  HYDROmorphone  Injectable 0.25 milliGRAM(s) IV Push every 10 minutes PRN Severe Pain (7 - 10)  HYDROmorphone (10 MICROgram(s)/mL) + BUpivacaine 0.0625% in 0.9% Sodium Chloride PCEA Rescue Clinician  Bolus 3 milliLiter(s) Epidural every 15 minutes PRN for Pain Score greater than 6  naloxone Injectable 0.1 milliGRAM(s) IV Push every 3 minutes PRN For ANY of the following changes in patient status:  A. RR LESS THAN 10 breaths per minute, B. Oxygen saturation LESS THAN 90%, C. Sedation score of 6  ondansetron Injectable 4 milliGRAM(s) IV Push every 6 hours PRN Nausea  ondansetron Injectable 4 milliGRAM(s) IV Push once PRN Nausea and/or Vomiting      LABS:                RADIOLOGY & ADDITIONAL STUDIES:    PHYSICAL EXAM:  Gen: Well-nourished, well-developed, A&O x3, resting in bed in no acute distress  CV: RRR  Resp: Patent airways, unlabored   Abdomen: Soft, mild incisional tenderness, mildly distended, no guarding, incision: mild blood tinged/Dry/ Intact, no active bleeding  Extremities: All 4 extremities warm and well perfused, no edema       A/P: 72y Male s/p exploratory laparotomy, lysis of adhesions, repair of ventral hernia, liver biopsy, aborted Whipple procedure. he is hemodynamically stable.     - Pain control: PCEA  - Diet: on CLD  - Activity: OOB as tolerated  - Labs: will f/u with AM lab  - DVT ppx: SQH  - Monitor GI and  function  - Glycemic control  - Dispo: PACU to [address] [place]

## 2018-03-07 NOTE — BRIEF OPERATIVE NOTE - PROCEDURE
<<-----Click on this checkbox to enter Procedure Exploratory laparotomy  03/07/2018  lysis of adhesions, repair of ventral hernia, liver biopsy.  Active  CBEHR

## 2018-03-07 NOTE — BRIEF OPERATIVE NOTE - POST-OP DX
Pancreatic cancer metastasized to liver  03/07/2018    Active  BehrAntonio  Ventral hernia  03/07/2018    Active  BehrAntonio

## 2018-03-08 ENCOUNTER — TRANSCRIPTION ENCOUNTER (OUTPATIENT)
Age: 73
End: 2018-03-08

## 2018-03-08 LAB
ALBUMIN SERPL ELPH-MCNC: 3.5 G/DL — SIGNIFICANT CHANGE UP (ref 3.3–5)
ALP SERPL-CCNC: 78 U/L — SIGNIFICANT CHANGE UP (ref 40–120)
ALT FLD-CCNC: 28 U/L — SIGNIFICANT CHANGE UP (ref 4–41)
APTT BLD: 34.3 SEC — SIGNIFICANT CHANGE UP (ref 27.5–37.4)
AST SERPL-CCNC: 41 U/L — HIGH (ref 4–40)
BASOPHILS # BLD AUTO: 0.01 K/UL — SIGNIFICANT CHANGE UP (ref 0–0.2)
BASOPHILS NFR BLD AUTO: 0.1 % — SIGNIFICANT CHANGE UP (ref 0–2)
BILIRUB DIRECT SERPL-MCNC: 1.1 MG/DL — HIGH (ref 0.1–0.2)
BILIRUB SERPL-MCNC: 2.3 MG/DL — HIGH (ref 0.2–1.2)
BUN SERPL-MCNC: 9 MG/DL — SIGNIFICANT CHANGE UP (ref 7–23)
CALCIUM SERPL-MCNC: 9.1 MG/DL — SIGNIFICANT CHANGE UP (ref 8.4–10.5)
CHLORIDE SERPL-SCNC: 101 MMOL/L — SIGNIFICANT CHANGE UP (ref 98–107)
CO2 SERPL-SCNC: 27 MMOL/L — SIGNIFICANT CHANGE UP (ref 22–31)
CREAT SERPL-MCNC: 1.03 MG/DL — SIGNIFICANT CHANGE UP (ref 0.5–1.3)
EOSINOPHIL # BLD AUTO: 0.12 K/UL — SIGNIFICANT CHANGE UP (ref 0–0.5)
EOSINOPHIL NFR BLD AUTO: 1.3 % — SIGNIFICANT CHANGE UP (ref 0–6)
GLUCOSE SERPL-MCNC: 106 MG/DL — HIGH (ref 70–99)
HCT VFR BLD CALC: 36.2 % — LOW (ref 39–50)
HGB BLD-MCNC: 11.7 G/DL — LOW (ref 13–17)
IMM GRANULOCYTES # BLD AUTO: 0.02 # — SIGNIFICANT CHANGE UP
IMM GRANULOCYTES NFR BLD AUTO: 0.2 % — SIGNIFICANT CHANGE UP (ref 0–1.5)
INR BLD: 1.14 — SIGNIFICANT CHANGE UP (ref 0.88–1.17)
LYMPHOCYTES # BLD AUTO: 3.91 K/UL — HIGH (ref 1–3.3)
LYMPHOCYTES # BLD AUTO: 42.3 % — SIGNIFICANT CHANGE UP (ref 13–44)
MAGNESIUM SERPL-MCNC: 1.7 MG/DL — SIGNIFICANT CHANGE UP (ref 1.6–2.6)
MCHC RBC-ENTMCNC: 28.1 PG — SIGNIFICANT CHANGE UP (ref 27–34)
MCHC RBC-ENTMCNC: 32.3 % — SIGNIFICANT CHANGE UP (ref 32–36)
MCV RBC AUTO: 87 FL — SIGNIFICANT CHANGE UP (ref 80–100)
MONOCYTES # BLD AUTO: 0.81 K/UL — SIGNIFICANT CHANGE UP (ref 0–0.9)
MONOCYTES NFR BLD AUTO: 8.8 % — SIGNIFICANT CHANGE UP (ref 2–14)
NEUTROPHILS # BLD AUTO: 4.37 K/UL — SIGNIFICANT CHANGE UP (ref 1.8–7.4)
NEUTROPHILS NFR BLD AUTO: 47.3 % — SIGNIFICANT CHANGE UP (ref 43–77)
NRBC # FLD: 0 — SIGNIFICANT CHANGE UP
PHOSPHATE SERPL-MCNC: 3.4 MG/DL — SIGNIFICANT CHANGE UP (ref 2.5–4.5)
PLATELET # BLD AUTO: 188 K/UL — SIGNIFICANT CHANGE UP (ref 150–400)
PMV BLD: 12.2 FL — SIGNIFICANT CHANGE UP (ref 7–13)
POTASSIUM SERPL-MCNC: 3.6 MMOL/L — SIGNIFICANT CHANGE UP (ref 3.5–5.3)
POTASSIUM SERPL-SCNC: 3.6 MMOL/L — SIGNIFICANT CHANGE UP (ref 3.5–5.3)
PROT SERPL-MCNC: 6.6 G/DL — SIGNIFICANT CHANGE UP (ref 6–8.3)
PROTHROM AB SERPL-ACNC: 12.7 SEC — SIGNIFICANT CHANGE UP (ref 9.8–13.1)
RBC # BLD: 4.16 M/UL — LOW (ref 4.2–5.8)
RBC # FLD: 12.8 % — SIGNIFICANT CHANGE UP (ref 10.3–14.5)
SODIUM SERPL-SCNC: 142 MMOL/L — SIGNIFICANT CHANGE UP (ref 135–145)
WBC # BLD: 9.24 K/UL — SIGNIFICANT CHANGE UP (ref 3.8–10.5)
WBC # FLD AUTO: 9.24 K/UL — SIGNIFICANT CHANGE UP (ref 3.8–10.5)

## 2018-03-08 PROCEDURE — 99223 1ST HOSP IP/OBS HIGH 75: CPT | Mod: GC

## 2018-03-08 RX ORDER — MAGNESIUM SULFATE 500 MG/ML
2 VIAL (ML) INJECTION ONCE
Qty: 0 | Refills: 0 | Status: COMPLETED | OUTPATIENT
Start: 2018-03-08 | End: 2018-03-08

## 2018-03-08 RX ORDER — POTASSIUM CHLORIDE 20 MEQ
20 PACKET (EA) ORAL ONCE
Qty: 0 | Refills: 0 | Status: COMPLETED | OUTPATIENT
Start: 2018-03-08 | End: 2018-03-08

## 2018-03-08 RX ORDER — OXYCODONE HYDROCHLORIDE 5 MG/1
5 TABLET ORAL
Qty: 0 | Refills: 0 | Status: DISCONTINUED | OUTPATIENT
Start: 2018-03-08 | End: 2018-03-09

## 2018-03-08 RX ORDER — OXYCODONE HYDROCHLORIDE 5 MG/1
10 TABLET ORAL
Qty: 0 | Refills: 0 | Status: DISCONTINUED | OUTPATIENT
Start: 2018-03-08 | End: 2018-03-09

## 2018-03-08 RX ORDER — DIPHENHYDRAMINE HCL 50 MG
25 CAPSULE ORAL ONCE
Qty: 0 | Refills: 0 | Status: COMPLETED | OUTPATIENT
Start: 2018-03-08 | End: 2018-03-08

## 2018-03-08 RX ADMIN — Medication 25 MILLIGRAM(S): at 07:57

## 2018-03-08 RX ADMIN — Medication 50 GRAM(S): at 12:13

## 2018-03-08 RX ADMIN — Medication 20 MILLIEQUIVALENT(S): at 12:13

## 2018-03-08 RX ADMIN — Medication 25 MILLIGRAM(S): at 00:23

## 2018-03-08 RX ADMIN — LISINOPRIL 5 MILLIGRAM(S): 2.5 TABLET ORAL at 05:59

## 2018-03-08 RX ADMIN — HEPARIN SODIUM 5000 UNIT(S): 5000 INJECTION INTRAVENOUS; SUBCUTANEOUS at 22:21

## 2018-03-08 RX ADMIN — OXYCODONE HYDROCHLORIDE 10 MILLIGRAM(S): 5 TABLET ORAL at 18:25

## 2018-03-08 RX ADMIN — SODIUM CHLORIDE 125 MILLILITER(S): 9 INJECTION, SOLUTION INTRAVENOUS at 22:21

## 2018-03-08 RX ADMIN — HEPARIN SODIUM 5000 UNIT(S): 5000 INJECTION INTRAVENOUS; SUBCUTANEOUS at 08:31

## 2018-03-08 RX ADMIN — SODIUM CHLORIDE 125 MILLILITER(S): 9 INJECTION, SOLUTION INTRAVENOUS at 07:10

## 2018-03-08 RX ADMIN — AMLODIPINE BESYLATE 2.5 MILLIGRAM(S): 2.5 TABLET ORAL at 05:59

## 2018-03-08 RX ADMIN — OXYCODONE HYDROCHLORIDE 10 MILLIGRAM(S): 5 TABLET ORAL at 17:35

## 2018-03-08 RX ADMIN — LATANOPROST 1 DROP(S): 0.05 SOLUTION/ DROPS OPHTHALMIC; TOPICAL at 22:21

## 2018-03-08 NOTE — DISCHARGE NOTE ADULT - PATIENT PORTAL LINK FT
You can access the [employer] [facility] Patient Portal, offered by [facility], by registering with the following website: [email or web address]

## 2018-03-08 NOTE — PROGRESS NOTE ADULT - SUBJECTIVE AND OBJECTIVE BOX
Anesthesia Pain Management Service- Attending Addendum    SUBJECTIVE: Patient's pain control adequate    Therapy:	  [ X] IV PCA	   [ ] Epidural           [ ] s/p Spinal Opoid              [ ] Postpartum infusion	  [ ] Patient controlled regional anesthesia (PCRA)    [ ] prn Analgesics    Allergies    No Known Allergies    Intolerances      MEDICATIONS  (STANDING):  amLODIPine   Tablet 2.5 milliGRAM(s) Oral daily  dextrose 5%. 1000 milliLiter(s) (50 mL/Hr) IV Continuous <Continuous>  dextrose 50% Injectable 12.5 Gram(s) IV Push once  dextrose 50% Injectable 25 Gram(s) IV Push once  dextrose 50% Injectable 25 Gram(s) IV Push once  heparin  Injectable 5000 Unit(s) SubCutaneous every 8 hours  insulin lispro (HumaLOG) corrective regimen sliding scale   SubCutaneous three times a day before meals  lactated ringers. 1000 milliLiter(s) (125 mL/Hr) IV Continuous <Continuous>  latanoprost 0.005% Ophthalmic Solution 1 Drop(s) Both EYES at bedtime  lisinopril 5 milliGRAM(s) Oral daily    MEDICATIONS  (PRN):  dextrose Gel 1 Dose(s) Oral once PRN Blood Glucose LESS THAN 70 milliGRAM(s)/deciliter  glucagon  Injectable 1 milliGRAM(s) IntraMuscular once PRN Glucose LESS THAN 70 milligrams/deciliter  oxyCODONE    IR 5 milliGRAM(s) Oral every 3 hours PRN Moderate Pain (4 - 6)  oxyCODONE    IR 10 milliGRAM(s) Oral every 3 hours PRN Severe Pain (7 - 10)      OBJECTIVE:   [X] No new signs     [ ] Other:    Side Effects:  [X ] None			[ ] Other:      ASSESSMENT/PLAN  -Discontinue current therapy    [ ] Therapy changed to:    [ ] IV PCA       [ ] Epidural     [ X] prn Analgesics     Comments: Pain management per primary team, APS to sign off

## 2018-03-08 NOTE — CONSULT NOTE ADULT - ASSESSMENT
72 year old male with recent diagnosis of pancreatic cancer, admitted for whipple procedure    Pancreatic cancer- metastatic disease found in liver during operation and whipple procedure aborted by surgery. will need to recover from surgery and once cleared from surgical standpoint can offer him systemic chemotherapy. he will need follow up at [facility] after discharge.   I will reach out to his daughter , Lizzy ( [phone]) 72 year old male with recent diagnosis of pancreatic cancer, admitted for whipple procedure    Pancreatic cancer- diagnosed by pancreatic head biopsy on 1/23/18 showing invasive adenocarcinoma  now unresectable  CT chest done 1/22/18 showing no other metastatic disease  metastatic disease found in liver during operation on 3/7/18 and whipple procedure aborted by surgery. will need to recover from surgery and once cleared from surgical standpoint can offer him systemic chemotherapy. he will need follow up at [facility] after discharge.   I will reach out to his daughter , Lizzy ( [phone])

## 2018-03-08 NOTE — DISCHARGE NOTE ADULT - CARE PROVIDERS DIRECT ADDRESSES
[email or web address]

## 2018-03-08 NOTE — DISCHARGE NOTE ADULT - CARE PROVIDER_API CALL
Cory Jones), Surgery; Surgical Oncology  [address] [place] [address]  Phone: [phone]  Fax: [phone]    Leann Guajardo), HematologyOncology; [facility]; Internal Medicine  [address] [place] [address]  Phone: [phone]  Fax: [phone]

## 2018-03-08 NOTE — PROGRESS NOTE ADULT - SUBJECTIVE AND OBJECTIVE BOX
Surgery D Team Progress Note    STATUS POST: metastatic pancreatic CA s/p aborted Whipple Procedure, segment 4 bx    POST OPERATIVE DAY # 1    SUBJECTIVE:   Patient was seen and examined this morning. There was no acute event overnight. He is resting comfortably in bed. Pain is well controlled with PCEA. he is tolerating CLD well. He does not report pain, fever, n/v, chest pain, or shortness of breath.     OBJECTIVE:   T(C): 37.2 (03-08-18 @ 07:48), Max: 37.5 (03-08-18 @ 00:09)  HR: 92 (03-08-18 @ 07:48) (60 - 97)  BP: 148/64 (03-08-18 @ 07:48) (126/82 - 149/63)  RR: 18 (03-08-18 @ 07:48) (13 - 19)  SpO2: 94% (03-08-18 @ 07:48) (94% - 100%)  Wt(kg): --  CAPILLARY BLOOD GLUCOSE      POCT Blood Glucose.: 98 mg/dL (08 Mar 2018 08:08)  POCT Blood Glucose.: 100 mg/dL (07 Mar 2018 15:39)    I&O's Detail    07 Mar 2018 07:01  -  08 Mar 2018 07:00  --------------------------------------------------------  IN:    lactated ringers.: 2250 mL    Oral Fluid: 240 mL  Total IN: 2490 mL    OUT:    Indwelling Catheter - Urethral: 1905 mL  Total OUT: 1905 mL    Total NET: 585 mL          PHYSICAL EXAM:  Gen: Well-nourished, well-developed, A&O x3, resting in bed in no acute distress  CV: RRR  Resp: Patent airways, unlabored   Abdomen: Soft, mild incisional tenderness, mildly distended, no guarding, incision: clean/ Dry/ Intact   Extremities: All 4 extremities warm and well perfused, no edema

## 2018-03-08 NOTE — DISCHARGE NOTE ADULT - INSTRUCTIONS
Return to normal diet Take medication as ordered, follow up with MD as advised, gradually increase activity, monitor abdominal incision for healing, call MD if any sign of infection such as fever, redness, swelling, oozing at site

## 2018-03-08 NOTE — PROGRESS NOTE ADULT - SUBJECTIVE AND OBJECTIVE BOX
Anesthesia Pain Management Service: Day _2_ of Epidural    SUBJECTIVE: Patient doing well with PCEA and no problems.  Pain Scale Score:   Refer to charted pain scores    THERAPY:  [x ] Epidural Bupivacaine 0.0625% and Hydromorphone  		[X ] 10 micrograms/mL	[ ] 5 micrograms/mL  [ ] Epidural Bupivacaine 0.0625% and Fentanyl - 2 micrograms/mL  [ ] Epidural Ropivacaine 0.1% plain – 1 mg/mL  [ ] Patient Controlled Regional Anesthesia (PCRA) Ropivacaine  		[ ] 0.2%			[ ] 0.1%    Demand dose __3_ lockout __15_ (minutes) Continuous Rate _4__ Total: _125.7ml__ Daily      MEDICATIONS  (STANDING):  amLODIPine   Tablet 2.5 milliGRAM(s) Oral daily  dextrose 5%. 1000 milliLiter(s) (50 mL/Hr) IV Continuous <Continuous>  dextrose 50% Injectable 12.5 Gram(s) IV Push once  dextrose 50% Injectable 25 Gram(s) IV Push once  dextrose 50% Injectable 25 Gram(s) IV Push once  heparin  Injectable 5000 Unit(s) SubCutaneous every 8 hours  insulin lispro (HumaLOG) corrective regimen sliding scale   SubCutaneous three times a day before meals  lactated ringers. 1000 milliLiter(s) (125 mL/Hr) IV Continuous <Continuous>  latanoprost 0.005% Ophthalmic Solution 1 Drop(s) Both EYES at bedtime  lisinopril 5 milliGRAM(s) Oral daily    MEDICATIONS  (PRN):  dextrose Gel 1 Dose(s) Oral once PRN Blood Glucose LESS THAN 70 milliGRAM(s)/deciliter  glucagon  Injectable 1 milliGRAM(s) IntraMuscular once PRN Glucose LESS THAN 70 milligrams/deciliter  oxyCODONE    IR 5 milliGRAM(s) Oral every 3 hours PRN Moderate Pain (4 - 6)  oxyCODONE    IR 10 milliGRAM(s) Oral every 3 hours PRN Severe Pain (7 - 10)      OBJECTIVE: pt. laying in bed     Assessment of Catheter Site:	[ ] Left	[ ] Right  [x ] Epidural 	[ ] Femoral	      [ ] Saphenous   [ ] Supraclavicular   [ ] Other:    [x ] Dressing intact	[x ] Site non-tender	[ x] Site without erythema, discharge, edema  [x ] Epidural tubing and connection checked	[x] Gross neurological exam within normal limits  [X ] Catheter removed – tip intact		[ ] Afebrile	  [ ] Febrile: ___       [ X] see Temp under VS below)    PT/INR - ( 08 Mar 2018 06:37 )   PT: 12.7 SEC;   INR: 1.14          PTT - ( 08 Mar 2018 06:37 )  PTT:34.3 SEC                      11.7   9.24  )-----------( 188      ( 08 Mar 2018 06:37 )             36.2     Vital Signs Last 24 Hrs  T(C): 36.8 (03-08-18 @ 12:26), Max: 37.5 (03-08-18 @ 00:09)  T(F): 98.3 (03-08-18 @ 12:26), Max: 99.5 (03-08-18 @ 00:09)  HR: 98 (03-08-18 @ 12:26) (80 - 98)  BP: 147/68 (03-08-18 @ 12:26) (139/61 - 149/63)  BP(mean): --  RR: 20 (03-08-18 @ 12:26) (14 - 20)  SpO2: 100% (03-08-18 @ 12:26) (94% - 100%)      Sedation Score:	[x ] Alert	[ ] Drowsy	[ ] Arousable	[ ] Asleep	[ ] Unresponsive    Side Effects:	[x ] None	[ ] Nausea	[ ] Vomiting	[ ] Pruritus  		[ ] Weakness		[ ] Numbness	[ ] Other:    ASSESSMENT/ PLAN:    Therapy to  be:	[ ] Continue   [ X] Discontinued   [ X] Change to prn Analgesics    Documentation and Verification of current medications:  [ X ] Done	[ ] Not done, not eligible, reason:    Comments: Changed to IV or oral opioid medication at this point.    Progress Note written now but Patient was seen earlier

## 2018-03-08 NOTE — DISCHARGE NOTE ADULT - PLAN OF CARE
return to normal activity BATHING: Please do not submerge wound underwater. You may shower and/or sponge bathe. It is OK to wash drain wound site.  ACTIVITY: No heavy lifting or straining. Otherwise, you may return to your usual level of physical activty. If you are taking narcotic pain medication (such as Percocet) DO NOT drive a car, operate machinery or make important decisions.  DIET: Return to your usual diet.  NOTIFY YOUR SURGEON IF: You have any bleeding that does not stop, any pus draining from your wound(s), any fever (over 100.4 F) or chills, persistent nausea/vomiting, persistent diarrhea, or if your pain is not controlled on your discharge pain medications.  FOLLOW-UP: Please follow up with your primary care physician in one week regarding your hospitalization BATHING: Please do not submerge wound underwater. You may shower and/or sponge bathe. It is OK to wash drain wound site.  ACTIVITY: No heavy lifting or straining. Otherwise, you may return to your usual level of physical activty. If you are taking narcotic pain medication (such as Percocet) DO NOT drive a car, operate machinery or make important decisions.  DIET: Return to your usual diet.  NOTIFY YOUR SURGEON IF: You have any bleeding that does not stop, any pus draining from your wound(s), any fever (over 100.4 F) or chills, persistent nausea/vomiting, persistent diarrhea, or if your pain is not controlled on your discharge pain medications.  FOLLOW-UP: Please follow up with your primary care physician in one week regarding your hospitalization    Take Oxycodone for severe pain only. You can take 650 mg Tylenol for mild-moderate pain.  Narcotic pain medicine can cause nausea and constipation. Drink plenty of water and take laxatives (colace, Miralax) as needed.   It is important to ice to keep swelling down and the pain manageable. Keep the ice on for 20 minutes, and then keep off for 20 minutes. Repeat while awake.

## 2018-03-08 NOTE — DISCHARGE NOTE ADULT - ADDITIONAL INSTRUCTIONS
Call to follow up with Dr. Jones in 1-2 weeks.  Call to follow up with Medical Oncology ([facility]) next week.

## 2018-03-08 NOTE — DISCHARGE NOTE ADULT - HOME CARE AGENCY
[facility] [phone] the nurse will visit a day after discharge. The nurse will call prior to visit.
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
## 2018-03-08 NOTE — DISCHARGE NOTE ADULT - HOSPITAL COURSE
72 year old male with history of pancreatic cancer, DM, HTN, and prostate cancer (2010) was admitted to [facility] on 2/27/18 for presurgical evaluation for Whipple and feeding jejunostomy. Patient underwent exploratory laparotomy on 3/7/18 with lysis of adhesions, repair of ventral hernia, and biopsy of lesions on liver. Biopsy revealed adenocarcinoma had spread to the liver and Whipple was aborted. Following surgery patient was transferred to the PACU and then floor, no acute post-operative events.   POD 1: Patient with PCEA for pain management, and on clear liquid diet. Pain well controlled and PCEA was discontinued later in the day. Hematology/Oncology team was consulted for outpatient chemotherapy management.    POD 2: Pain was well controlled with PO medications and patient was tolerating diet well. Patient is voiding and has + GI function. Ambulating without difficulty. Discussed with attending, patient stable for discharge with outpatient follow up. Patient also to follow up with Heme/Onc for medical management.

## 2018-03-08 NOTE — DISCHARGE NOTE ADULT - MEDICATION SUMMARY - MEDICATIONS TO TAKE
I will START or STAY ON the medications listed below when I get home from the hospital:    Roxicodone 5 mg oral tablet  -- 1 tab(s) by mouth every 3 hours, As needed, Moderate Pain (4 - 6)  -- Indication: For pain     lisinopril 5 mg oral tablet  -- 1 tab(s) by mouth once a day in pm  -- Indication: For Hypertension    metFORMIN 500 mg oral tablet  -- 1 tab(s) by mouth once a day in pm  -- Indication: For Diabetes    amLODIPine 2.5 mg oral tablet  -- 1 tab(s) by mouth once a day in pm  -- Indication: For Hypertension    polyethylene glycol 3350 oral powder for reconstitution  -- 17 gram(s) by mouth once a day, As needed, Constipation  -- Indication: For constipation    senna oral tablet  -- 2 tab(s) by mouth once a day (at bedtime)  -- Indication: For constipation    docusate sodium 100 mg oral capsule  -- 1 cap(s) by mouth 3 times a day  -- Indication: For constipation    latanoprost 0.005% ophthalmic solution  -- 1 drop(s) in the right eye once a day (in the evening)  -- Indication: For eye drops I will START or STAY ON the medications listed below when I get home from the hospital:    oxyCODONE 5 mg oral tablet  -- 1-2 tab(s) by mouth every 4 to 6 hours, As Needed -Moderate to severe  Pain (4 - 10) MDD:4   -- Indication: For severe pain    lisinopril 5 mg oral tablet  -- 1 tab(s) by mouth once a day in pm  -- Indication: For Home med    metFORMIN 500 mg oral tablet  -- 1 tab(s) by mouth once a day in pm  -- Indication: For Home med    amLODIPine 2.5 mg oral tablet  -- 1 tab(s) by mouth once a day in pm  -- Indication: For Home med    docusate sodium 100 mg oral capsule  -- 1 cap(s) by mouth 3 times a day  -- Indication: For constipation    polyethylene glycol 3350 oral powder for reconstitution  -- 17 gram(s) by mouth once a day, As needed, Constipation  -- Indication: For constipation    senna oral tablet  -- 2 tab(s) by mouth once a day (at bedtime), As Needed constipation  -- Indication: For constipation    latanoprost 0.005% ophthalmic solution  -- 1 drop(s) in the right eye once a day (in the evening)  -- Indication: For Home med

## 2018-03-09 VITALS
SYSTOLIC BLOOD PRESSURE: 154 MMHG | HEART RATE: 99 BPM | DIASTOLIC BLOOD PRESSURE: 80 MMHG | OXYGEN SATURATION: 98 % | RESPIRATION RATE: 18 BRPM | TEMPERATURE: 99 F

## 2018-03-09 LAB
ALBUMIN SERPL ELPH-MCNC: 3.8 G/DL — SIGNIFICANT CHANGE UP (ref 3.3–5)
ALP SERPL-CCNC: 85 U/L — SIGNIFICANT CHANGE UP (ref 40–120)
ALT FLD-CCNC: 25 U/L — SIGNIFICANT CHANGE UP (ref 4–41)
AST SERPL-CCNC: 33 U/L — SIGNIFICANT CHANGE UP (ref 4–40)
BILIRUB SERPL-MCNC: 2.8 MG/DL — HIGH (ref 0.2–1.2)
BUN SERPL-MCNC: 8 MG/DL — SIGNIFICANT CHANGE UP (ref 7–23)
CALCIUM SERPL-MCNC: 9.1 MG/DL — SIGNIFICANT CHANGE UP (ref 8.4–10.5)
CHLORIDE SERPL-SCNC: 99 MMOL/L — SIGNIFICANT CHANGE UP (ref 98–107)
CO2 SERPL-SCNC: 28 MMOL/L — SIGNIFICANT CHANGE UP (ref 22–31)
CREAT SERPL-MCNC: 1.05 MG/DL — SIGNIFICANT CHANGE UP (ref 0.5–1.3)
GLUCOSE SERPL-MCNC: 116 MG/DL — HIGH (ref 70–99)
HCT VFR BLD CALC: 39.4 % — SIGNIFICANT CHANGE UP (ref 39–50)
HGB BLD-MCNC: 13 G/DL — SIGNIFICANT CHANGE UP (ref 13–17)
MAGNESIUM SERPL-MCNC: 1.8 MG/DL — SIGNIFICANT CHANGE UP (ref 1.6–2.6)
MCHC RBC-ENTMCNC: 29.1 PG — SIGNIFICANT CHANGE UP (ref 27–34)
MCHC RBC-ENTMCNC: 33 % — SIGNIFICANT CHANGE UP (ref 32–36)
MCV RBC AUTO: 88.3 FL — SIGNIFICANT CHANGE UP (ref 80–100)
NRBC # FLD: 0 — SIGNIFICANT CHANGE UP
PHOSPHATE SERPL-MCNC: 3 MG/DL — SIGNIFICANT CHANGE UP (ref 2.5–4.5)
PLATELET # BLD AUTO: 207 K/UL — SIGNIFICANT CHANGE UP (ref 150–400)
PMV BLD: 12 FL — SIGNIFICANT CHANGE UP (ref 7–13)
POTASSIUM SERPL-MCNC: 3.3 MMOL/L — LOW (ref 3.5–5.3)
POTASSIUM SERPL-SCNC: 3.3 MMOL/L — LOW (ref 3.5–5.3)
PROT SERPL-MCNC: 7.3 G/DL — SIGNIFICANT CHANGE UP (ref 6–8.3)
RBC # BLD: 4.46 M/UL — SIGNIFICANT CHANGE UP (ref 4.2–5.8)
RBC # FLD: 12.6 % — SIGNIFICANT CHANGE UP (ref 10.3–14.5)
SODIUM SERPL-SCNC: 141 MMOL/L — SIGNIFICANT CHANGE UP (ref 135–145)
WBC # BLD: 9.59 K/UL — SIGNIFICANT CHANGE UP (ref 3.8–10.5)
WBC # FLD AUTO: 9.59 K/UL — SIGNIFICANT CHANGE UP (ref 3.8–10.5)

## 2018-03-09 RX ORDER — POLYETHYLENE GLYCOL 3350 17 G/17G
17 POWDER, FOR SOLUTION ORAL DAILY
Qty: 0 | Refills: 0 | Status: DISCONTINUED | OUTPATIENT
Start: 2018-03-09 | End: 2018-03-09

## 2018-03-09 RX ORDER — OXYCODONE HYDROCHLORIDE 5 MG/1
1 TABLET ORAL
Qty: 0 | Refills: 0 | COMMUNITY
Start: 2018-03-09

## 2018-03-09 RX ORDER — POLYETHYLENE GLYCOL 3350 17 G/17G
17 POWDER, FOR SOLUTION ORAL
Qty: 0 | Refills: 0 | COMMUNITY
Start: 2018-03-09

## 2018-03-09 RX ORDER — MAGNESIUM SULFATE 500 MG/ML
2 VIAL (ML) INJECTION ONCE
Qty: 0 | Refills: 0 | Status: COMPLETED | OUTPATIENT
Start: 2018-03-09 | End: 2018-03-09

## 2018-03-09 RX ORDER — SENNA PLUS 8.6 MG/1
2 TABLET ORAL
Qty: 0 | Refills: 0 | COMMUNITY
Start: 2018-03-09

## 2018-03-09 RX ORDER — DOCUSATE SODIUM 100 MG
100 CAPSULE ORAL
Qty: 0 | Refills: 0 | Status: DISCONTINUED | OUTPATIENT
Start: 2018-03-09 | End: 2018-03-09

## 2018-03-09 RX ORDER — DOCUSATE SODIUM 100 MG
1 CAPSULE ORAL
Qty: 90 | Refills: 0 | OUTPATIENT
Start: 2018-03-09 | End: 2018-04-07

## 2018-03-09 RX ORDER — SENNA PLUS 8.6 MG/1
2 TABLET ORAL
Qty: 28 | Refills: 0 | OUTPATIENT
Start: 2018-03-09 | End: 2018-03-22

## 2018-03-09 RX ORDER — OXYCODONE HYDROCHLORIDE 5 MG/1
1 TABLET ORAL
Qty: 60 | Refills: 0 | OUTPATIENT
Start: 2018-03-09 | End: 2018-03-18

## 2018-03-09 RX ORDER — SENNA PLUS 8.6 MG/1
2 TABLET ORAL AT BEDTIME
Qty: 0 | Refills: 0 | Status: DISCONTINUED | OUTPATIENT
Start: 2018-03-09 | End: 2018-03-09

## 2018-03-09 RX ORDER — POTASSIUM CHLORIDE 20 MEQ
20 PACKET (EA) ORAL
Qty: 0 | Refills: 0 | Status: COMPLETED | OUTPATIENT
Start: 2018-03-09 | End: 2018-03-09

## 2018-03-09 RX ORDER — POLYETHYLENE GLYCOL 3350 17 G/17G
17 POWDER, FOR SOLUTION ORAL
Qty: 1 | Refills: 0 | OUTPATIENT
Start: 2018-03-09 | End: 2018-03-22

## 2018-03-09 RX ADMIN — AMLODIPINE BESYLATE 2.5 MILLIGRAM(S): 2.5 TABLET ORAL at 05:01

## 2018-03-09 RX ADMIN — OXYCODONE HYDROCHLORIDE 10 MILLIGRAM(S): 5 TABLET ORAL at 07:13

## 2018-03-09 RX ADMIN — OXYCODONE HYDROCHLORIDE 10 MILLIGRAM(S): 5 TABLET ORAL at 15:11

## 2018-03-09 RX ADMIN — HEPARIN SODIUM 5000 UNIT(S): 5000 INJECTION INTRAVENOUS; SUBCUTANEOUS at 05:01

## 2018-03-09 RX ADMIN — Medication 50 GRAM(S): at 12:24

## 2018-03-09 RX ADMIN — OXYCODONE HYDROCHLORIDE 10 MILLIGRAM(S): 5 TABLET ORAL at 06:17

## 2018-03-09 RX ADMIN — Medication 20 MILLIEQUIVALENT(S): at 15:11

## 2018-03-09 RX ADMIN — Medication 20 MILLIEQUIVALENT(S): at 12:24

## 2018-03-09 RX ADMIN — LISINOPRIL 5 MILLIGRAM(S): 2.5 TABLET ORAL at 05:01

## 2018-03-09 NOTE — PROGRESS NOTE ADULT - SUBJECTIVE AND OBJECTIVE BOX
D Team Surgery    Post Op Day#: 2    24 hr events/Subjective:     No acute issues overnight  Pain controlled with medications  tolerating diet    Physical Exam:    General:  NAD, laying comfortably in bed  Chest:  unlabored breathing  Abdomen:  soft, non-tender, mildly distended, incision c/d/i, no erythema or edema       Vital Signs Last 24 Hrs  T(C): 37 (09 Mar 2018 04:59), Max: 37.4 (08 Mar 2018 17:59)  T(F): 98.6 (09 Mar 2018 04:59), Max: 99.3 (08 Mar 2018 17:59)  HR: 95 (09 Mar 2018 04:59) (86 - 98)  BP: 144/77 (09 Mar 2018 04:59) (141/74 - 155/67)  BP(mean): --  RR: 18 (09 Mar 2018 04:59) (18 - 20)  SpO2: 93% (09 Mar 2018 04:59) (93% - 100%)    I&O's Detail    07 Mar 2018 07:01  -  08 Mar 2018 07:00  --------------------------------------------------------  IN:    lactated ringers.: 2250 mL    Oral Fluid: 240 mL  Total IN: 2490 mL    OUT:    Indwelling Catheter - Urethral: 1905 mL  Total OUT: 1905 mL    Total NET: 585 mL      08 Mar 2018 07:01  -  09 Mar 2018 06:45  --------------------------------------------------------  IN:    lactated ringers.: 1375 mL    Oral Fluid: 200 mL  Total IN: 1575 mL    OUT:    Indwelling Catheter - Urethral: 1455 mL    Voided: 1220 mL  Total OUT: 2675 mL    Total NET: -1100 mL                                11.7   9.24  )-----------( 188      ( 08 Mar 2018 06:37 )             36.2     03-08    142  |  101  |  9   ----------------------------<  106<H>  3.6   |  27  |  1.03    Ca    9.1      08 Mar 2018 06:37  Phos  3.4     03-08  Mg     1.7     03-08    TPro  6.6  /  Alb  3.5  /  TBili  2.3<H>  /  DBili  1.1<H>  /  AST  41<H>  /  ALT  28  /  AlkPhos  78  03-08    LIVER FUNCTIONS - ( 08 Mar 2018 06:37 )  Alb: 3.5 g/dL / Pro: 6.6 g/dL / ALK PHOS: 78 u/L / ALT: 28 u/L / AST: 41 u/L / GGT: x

## 2018-03-09 NOTE — PROGRESS NOTE ADULT - ASSESSMENT
Mr. Kirkpatrick is a 72-year-old patient  s/p exploratory laparotomy, FLAKITO, liver biopsy, and  aborted Whipple procedure, POD1. He is hemodynamically stable. Labs are significant for hyperbilirubinemia trending down).    - DVT ppx: SQH  - Diet: CLD  - ABx regimen:   - Pain control: will discontinue PCEA and start PO pain mendications  - Monitor GI function  - Replete electrolyte as necessary  - Activity: OOb as tolerated  - will consult Hem/Oncology team regarding his unresectable malignancy to follow him up
72M POD 2 sp aborted whipple for metastatic disease    ·	Neuro: pain well controlled on PO meds  ·	Resp: OOB, IS  ·	GI: tolerating reg diet  ·	DVT prophylaxis with SQH  ·	DC home today    D Team Pager [phone]

## 2018-03-15 ENCOUNTER — TRANSCRIPTION ENCOUNTER (OUTPATIENT)
Age: 73
End: 2018-03-15

## 2018-03-20 ENCOUNTER — APPOINTMENT (OUTPATIENT)
Dept: SURGICAL ONCOLOGY | Facility: CLINIC | Age: 73
End: 2018-03-20
Payer: SELF-PAY

## 2018-03-20 VITALS
BODY MASS INDEX: 21.19 KG/M2 | WEIGHT: 148 LBS | OXYGEN SATURATION: 100 % | TEMPERATURE: 97.9 F | HEART RATE: 85 BPM | HEIGHT: 70 IN | DIASTOLIC BLOOD PRESSURE: 87 MMHG | SYSTOLIC BLOOD PRESSURE: 147 MMHG

## 2018-03-20 PROCEDURE — 99024 POSTOP FOLLOW-UP VISIT: CPT

## 2018-03-28 ENCOUNTER — OUTPATIENT (OUTPATIENT)
Dept: OUTPATIENT SERVICES | Facility: HOSPITAL | Age: 73
LOS: 1 days | Discharge: HOME | End: 2018-03-28

## 2018-03-28 DIAGNOSIS — C25.9 MALIGNANT NEOPLASM OF PANCREAS, UNSPECIFIED: ICD-10-CM

## 2018-03-28 DIAGNOSIS — Z98.49 CATARACT EXTRACTION STATUS, UNSPECIFIED EYE: Chronic | ICD-10-CM

## 2018-03-28 DIAGNOSIS — Z98.890 OTHER SPECIFIED POSTPROCEDURAL STATES: Chronic | ICD-10-CM

## 2018-03-28 DIAGNOSIS — K83.1 OBSTRUCTION OF BILE DUCT: Chronic | ICD-10-CM

## 2018-03-30 ENCOUNTER — RESULT REVIEW (OUTPATIENT)
Age: 73
End: 2018-03-30

## 2018-03-30 ENCOUNTER — APPOINTMENT (OUTPATIENT)
Dept: HEMATOLOGY ONCOLOGY | Facility: CLINIC | Age: 73
End: 2018-03-30
Payer: MEDICARE

## 2018-03-30 VITALS
WEIGHT: 145.48 LBS | HEIGHT: 70.87 IN | RESPIRATION RATE: 16 BRPM | SYSTOLIC BLOOD PRESSURE: 115 MMHG | BODY MASS INDEX: 20.37 KG/M2 | OXYGEN SATURATION: 100 % | HEART RATE: 105 BPM | TEMPERATURE: 98.8 F | DIASTOLIC BLOOD PRESSURE: 72 MMHG

## 2018-03-30 DIAGNOSIS — Z80.6 FAMILY HISTORY OF LEUKEMIA: ICD-10-CM

## 2018-03-30 DIAGNOSIS — Z87.891 PERSONAL HISTORY OF NICOTINE DEPENDENCE: ICD-10-CM

## 2018-03-30 DIAGNOSIS — Z82.1 FAMILY HISTORY OF BLINDNESS AND VISUAL LOSS: ICD-10-CM

## 2018-03-30 DIAGNOSIS — Z78.9 OTHER SPECIFIED HEALTH STATUS: ICD-10-CM

## 2018-03-30 DIAGNOSIS — Z85.46 PERSONAL HISTORY OF MALIGNANT NEOPLASM OF PROSTATE: ICD-10-CM

## 2018-03-30 DIAGNOSIS — Z00.00 ENCOUNTER FOR GENERAL ADULT MEDICAL EXAMINATION W/OUT ABNORMAL FINDINGS: ICD-10-CM

## 2018-03-30 LAB
BASOPHILS # BLD AUTO: 0 K/UL — SIGNIFICANT CHANGE UP (ref 0–0.2)
BASOPHILS NFR BLD AUTO: 0.6 % — SIGNIFICANT CHANGE UP (ref 0–2)
EOSINOPHIL # BLD AUTO: 0.2 K/UL — SIGNIFICANT CHANGE UP (ref 0–0.5)
EOSINOPHIL NFR BLD AUTO: 3.6 % — SIGNIFICANT CHANGE UP (ref 0–6)
HCT VFR BLD CALC: 40.3 % — SIGNIFICANT CHANGE UP (ref 39–50)
HGB BLD-MCNC: 13.5 G/DL — SIGNIFICANT CHANGE UP (ref 13–17)
LYMPHOCYTES # BLD AUTO: 2.4 K/UL — SIGNIFICANT CHANGE UP (ref 1–3.3)
LYMPHOCYTES # BLD AUTO: 35 % — SIGNIFICANT CHANGE UP (ref 13–44)
MCHC RBC-ENTMCNC: 29.4 PG — SIGNIFICANT CHANGE UP (ref 27–34)
MCHC RBC-ENTMCNC: 33.6 G/DL — SIGNIFICANT CHANGE UP (ref 32–36)
MCV RBC AUTO: 87.5 FL — SIGNIFICANT CHANGE UP (ref 80–100)
MONOCYTES # BLD AUTO: 0.5 K/UL — SIGNIFICANT CHANGE UP (ref 0–0.9)
MONOCYTES NFR BLD AUTO: 7.7 % — SIGNIFICANT CHANGE UP (ref 2–14)
NEUTROPHILS # BLD AUTO: 3.6 K/UL — SIGNIFICANT CHANGE UP (ref 1.8–7.4)
NEUTROPHILS NFR BLD AUTO: 53.1 % — SIGNIFICANT CHANGE UP (ref 43–77)
PLATELET # BLD AUTO: 199 K/UL — SIGNIFICANT CHANGE UP (ref 150–400)
RBC # BLD: 4.6 M/UL — SIGNIFICANT CHANGE UP (ref 4.2–5.8)
RBC # FLD: 12.1 % — SIGNIFICANT CHANGE UP (ref 10.3–14.5)
WBC # BLD: 6.8 K/UL — SIGNIFICANT CHANGE UP (ref 3.8–10.5)
WBC # FLD AUTO: 6.8 K/UL — SIGNIFICANT CHANGE UP (ref 3.8–10.5)

## 2018-03-30 PROCEDURE — 99215 OFFICE O/P EST HI 40 MIN: CPT

## 2018-03-30 RX ORDER — ONDANSETRON 4 MG/1
4 TABLET ORAL
Qty: 60 | Refills: 1 | Status: ACTIVE | COMMUNITY
Start: 2018-03-30 | End: 1900-01-01

## 2018-03-30 RX ORDER — LATANOPROST/PF 0.005 %
0.01 DROPS OPHTHALMIC (EYE)
Qty: 2 | Refills: 0 | Status: ACTIVE | COMMUNITY
Start: 2017-09-15

## 2018-03-30 RX ORDER — DOCUSATE SODIUM 100 MG/1
100 CAPSULE ORAL TWICE DAILY
Qty: 60 | Refills: 3 | Status: ACTIVE | COMMUNITY
Start: 2018-03-30

## 2018-04-04 ENCOUNTER — FORM ENCOUNTER (OUTPATIENT)
Age: 73
End: 2018-04-04

## 2018-04-05 ENCOUNTER — APPOINTMENT (OUTPATIENT)
Dept: CT IMAGING | Facility: IMAGING CENTER | Age: 73
End: 2018-04-05
Payer: MEDICARE

## 2018-04-05 ENCOUNTER — OUTPATIENT (OUTPATIENT)
Dept: OUTPATIENT SERVICES | Facility: HOSPITAL | Age: 73
LOS: 1 days | End: 2018-04-05
Payer: COMMERCIAL

## 2018-04-05 DIAGNOSIS — Z98.890 OTHER SPECIFIED POSTPROCEDURAL STATES: Chronic | ICD-10-CM

## 2018-04-05 DIAGNOSIS — K83.1 OBSTRUCTION OF BILE DUCT: Chronic | ICD-10-CM

## 2018-04-05 DIAGNOSIS — C25.9 MALIGNANT NEOPLASM OF PANCREAS, UNSPECIFIED: ICD-10-CM

## 2018-04-05 DIAGNOSIS — Z98.49 CATARACT EXTRACTION STATUS, UNSPECIFIED EYE: Chronic | ICD-10-CM

## 2018-04-05 PROCEDURE — 71260 CT THORAX DX C+: CPT

## 2018-04-05 PROCEDURE — 71260 CT THORAX DX C+: CPT | Mod: 26

## 2018-04-05 PROCEDURE — 74177 CT ABD & PELVIS W/CONTRAST: CPT | Mod: 26

## 2018-04-05 PROCEDURE — 74177 CT ABD & PELVIS W/CONTRAST: CPT

## 2018-04-09 ENCOUNTER — APPOINTMENT (OUTPATIENT)
Age: 73
End: 2018-04-09

## 2018-04-09 ENCOUNTER — RESULT REVIEW (OUTPATIENT)
Age: 73
End: 2018-04-09

## 2018-04-09 ENCOUNTER — OTHER (OUTPATIENT)
Age: 73
End: 2018-04-09

## 2018-04-09 LAB
BASOPHILS # BLD AUTO: 0 K/UL — SIGNIFICANT CHANGE UP (ref 0–0.2)
BASOPHILS NFR BLD AUTO: 0.2 % — SIGNIFICANT CHANGE UP (ref 0–2)
EOSINOPHIL # BLD AUTO: 0.4 K/UL — SIGNIFICANT CHANGE UP (ref 0–0.5)
EOSINOPHIL NFR BLD AUTO: 5.6 % — SIGNIFICANT CHANGE UP (ref 0–6)
HCT VFR BLD CALC: 37.1 % — LOW (ref 39–50)
HGB BLD-MCNC: 12.6 G/DL — LOW (ref 13–17)
LYMPHOCYTES # BLD AUTO: 3.2 K/UL — SIGNIFICANT CHANGE UP (ref 1–3.3)
LYMPHOCYTES # BLD AUTO: 41.3 % — SIGNIFICANT CHANGE UP (ref 13–44)
MCHC RBC-ENTMCNC: 29.5 PG — SIGNIFICANT CHANGE UP (ref 27–34)
MCHC RBC-ENTMCNC: 34 G/DL — SIGNIFICANT CHANGE UP (ref 32–36)
MCV RBC AUTO: 86.7 FL — SIGNIFICANT CHANGE UP (ref 80–100)
MONOCYTES # BLD AUTO: 1 K/UL — HIGH (ref 0–0.9)
MONOCYTES NFR BLD AUTO: 12.8 % — SIGNIFICANT CHANGE UP (ref 2–14)
NEUTROPHILS # BLD AUTO: 3.1 K/UL — SIGNIFICANT CHANGE UP (ref 1.8–7.4)
NEUTROPHILS NFR BLD AUTO: 40.2 % — LOW (ref 43–77)
PLATELET # BLD AUTO: 215 K/UL — SIGNIFICANT CHANGE UP (ref 150–400)
RBC # BLD: 4.28 M/UL — SIGNIFICANT CHANGE UP (ref 4.2–5.8)
RBC # FLD: 12.1 % — SIGNIFICANT CHANGE UP (ref 10.3–14.5)
WBC # BLD: 7.7 K/UL — SIGNIFICANT CHANGE UP (ref 3.8–10.5)
WBC # FLD AUTO: 7.7 K/UL — SIGNIFICANT CHANGE UP (ref 3.8–10.5)

## 2018-04-10 DIAGNOSIS — R11.2 NAUSEA WITH VOMITING, UNSPECIFIED: ICD-10-CM

## 2018-04-10 DIAGNOSIS — Z51.11 ENCOUNTER FOR ANTINEOPLASTIC CHEMOTHERAPY: ICD-10-CM

## 2018-04-14 LAB
ALBUMIN SERPL ELPH-MCNC: 4.2 G/DL
ALP BLD-CCNC: 130 U/L
ALT SERPL-CCNC: 18 U/L
ANION GAP SERPL CALC-SCNC: 14 MMOL/L
APTT BLD: 31.2 SEC
AST SERPL-CCNC: 24 U/L
BILIRUB SERPL-MCNC: 1.6 MG/DL
BUN SERPL-MCNC: 12 MG/DL
CALCIUM SERPL-MCNC: 10.2 MG/DL
CANCER AG19-9 SERPL-ACNC: 108.4 U/ML
CEA SERPL-MCNC: 2.9 NG/ML
CHLORIDE SERPL-SCNC: 98 MMOL/L
CO2 SERPL-SCNC: 27 MMOL/L
CREAT SERPL-MCNC: 1.17 MG/DL
GLUCOSE SERPL-MCNC: 212 MG/DL
HBV CORE IGG+IGM SER QL: NONREACTIVE
HBV SURFACE AB SER QL: REACTIVE
HBV SURFACE AG SER QL: NONREACTIVE
HCV AB SER QL: NONREACTIVE
HCV S/CO RATIO: 0.2 S/CO
INR PPP: 1.09 RATIO
POTASSIUM SERPL-SCNC: 4.3 MMOL/L
PROT SERPL-MCNC: 7.6 G/DL
PT BLD: 12.3 SEC
SODIUM SERPL-SCNC: 139 MMOL/L

## 2018-04-16 ENCOUNTER — APPOINTMENT (OUTPATIENT)
Dept: HEMATOLOGY ONCOLOGY | Facility: CLINIC | Age: 73
End: 2018-04-16
Payer: MEDICARE

## 2018-04-16 VITALS
WEIGHT: 139.99 LBS | RESPIRATION RATE: 17 BRPM | HEART RATE: 108 BPM | OXYGEN SATURATION: 98 % | DIASTOLIC BLOOD PRESSURE: 70 MMHG | SYSTOLIC BLOOD PRESSURE: 130 MMHG | TEMPERATURE: 98.2 F | BODY MASS INDEX: 19.6 KG/M2

## 2018-04-16 PROCEDURE — 99214 OFFICE O/P EST MOD 30 MIN: CPT

## 2018-04-16 RX ORDER — MIRTAZAPINE 15 MG/1
15 TABLET, FILM COATED ORAL
Qty: 30 | Refills: 3 | Status: ACTIVE | COMMUNITY
Start: 2018-04-16 | End: 1900-01-01

## 2018-04-18 ENCOUNTER — OTHER (OUTPATIENT)
Age: 73
End: 2018-04-18

## 2018-04-23 ENCOUNTER — APPOINTMENT (OUTPATIENT)
Age: 73
End: 2018-04-23

## 2018-04-23 ENCOUNTER — RESULT REVIEW (OUTPATIENT)
Age: 73
End: 2018-04-23

## 2018-04-23 ENCOUNTER — LABORATORY RESULT (OUTPATIENT)
Age: 73
End: 2018-04-23

## 2018-04-23 LAB
BASOPHILS # BLD AUTO: 0 K/UL — SIGNIFICANT CHANGE UP (ref 0–0.2)
BASOPHILS NFR BLD AUTO: 0.5 % — SIGNIFICANT CHANGE UP (ref 0–2)
EOSINOPHIL # BLD AUTO: 0.4 K/UL — SIGNIFICANT CHANGE UP (ref 0–0.5)
EOSINOPHIL NFR BLD AUTO: 5.3 % — SIGNIFICANT CHANGE UP (ref 0–6)
HCT VFR BLD CALC: 30.8 % — LOW (ref 39–50)
HGB BLD-MCNC: 10.7 G/DL — LOW (ref 13–17)
LYMPHOCYTES # BLD AUTO: 2.5 K/UL — SIGNIFICANT CHANGE UP (ref 1–3.3)
LYMPHOCYTES # BLD AUTO: 31.3 % — SIGNIFICANT CHANGE UP (ref 13–44)
MCHC RBC-ENTMCNC: 30.6 PG — SIGNIFICANT CHANGE UP (ref 27–34)
MCHC RBC-ENTMCNC: 34.7 G/DL — SIGNIFICANT CHANGE UP (ref 32–36)
MCV RBC AUTO: 88.2 FL — SIGNIFICANT CHANGE UP (ref 80–100)
MONOCYTES # BLD AUTO: 0.8 K/UL — SIGNIFICANT CHANGE UP (ref 0–0.9)
MONOCYTES NFR BLD AUTO: 10.5 % — SIGNIFICANT CHANGE UP (ref 2–14)
NEUTROPHILS # BLD AUTO: 4.1 K/UL — SIGNIFICANT CHANGE UP (ref 1.8–7.4)
NEUTROPHILS NFR BLD AUTO: 52.4 % — SIGNIFICANT CHANGE UP (ref 43–77)
PLATELET # BLD AUTO: 341 K/UL — SIGNIFICANT CHANGE UP (ref 150–400)
RBC # BLD: 3.49 M/UL — LOW (ref 4.2–5.8)
RBC # FLD: 13.5 % — SIGNIFICANT CHANGE UP (ref 10.3–14.5)
WBC # BLD: 7.9 K/UL — SIGNIFICANT CHANGE UP (ref 3.8–10.5)
WBC # FLD AUTO: 7.9 K/UL — SIGNIFICANT CHANGE UP (ref 3.8–10.5)

## 2018-04-30 ENCOUNTER — OUTPATIENT (OUTPATIENT)
Dept: OUTPATIENT SERVICES | Facility: HOSPITAL | Age: 73
LOS: 1 days | Discharge: HOME | End: 2018-04-30

## 2018-04-30 DIAGNOSIS — Z98.49 CATARACT EXTRACTION STATUS, UNSPECIFIED EYE: Chronic | ICD-10-CM

## 2018-04-30 DIAGNOSIS — Z98.890 OTHER SPECIFIED POSTPROCEDURAL STATES: Chronic | ICD-10-CM

## 2018-04-30 DIAGNOSIS — K83.1 OBSTRUCTION OF BILE DUCT: Chronic | ICD-10-CM

## 2018-04-30 DIAGNOSIS — C25.9 MALIGNANT NEOPLASM OF PANCREAS, UNSPECIFIED: ICD-10-CM

## 2018-05-02 ENCOUNTER — APPOINTMENT (OUTPATIENT)
Age: 73
End: 2018-05-02
Payer: MEDICARE

## 2018-05-02 VITALS
BODY MASS INDEX: 20.06 KG/M2 | SYSTOLIC BLOOD PRESSURE: 130 MMHG | RESPIRATION RATE: 18 BRPM | HEART RATE: 72 BPM | DIASTOLIC BLOOD PRESSURE: 70 MMHG | WEIGHT: 143.3 LBS | TEMPERATURE: 98.2 F | OXYGEN SATURATION: 100 %

## 2018-05-02 DIAGNOSIS — K59.03 DRUG INDUCED CONSTIPATION: ICD-10-CM

## 2018-05-02 PROCEDURE — 99213 OFFICE O/P EST LOW 20 MIN: CPT

## 2018-05-02 RX ORDER — POLYETHYLENE GLYCOL 3350 17 G/17G
17 POWDER, FOR SOLUTION ORAL DAILY
Qty: 20 | Refills: 3 | Status: ACTIVE | COMMUNITY
Start: 2018-05-02 | End: 1900-01-01

## 2018-05-07 ENCOUNTER — TRANSCRIPTION ENCOUNTER (OUTPATIENT)
Age: 73
End: 2018-05-07

## 2018-05-07 ENCOUNTER — INPATIENT (INPATIENT)
Facility: HOSPITAL | Age: 73
LOS: 2 days | Discharge: HOME | End: 2018-05-10
Attending: HOSPITALIST | Admitting: HOSPITALIST
Payer: MEDICARE

## 2018-05-07 VITALS
SYSTOLIC BLOOD PRESSURE: 109 MMHG | HEART RATE: 92 BPM | RESPIRATION RATE: 18 BRPM | TEMPERATURE: 98 F | OXYGEN SATURATION: 100 % | DIASTOLIC BLOOD PRESSURE: 44 MMHG

## 2018-05-07 DIAGNOSIS — Z98.890 OTHER SPECIFIED POSTPROCEDURAL STATES: Chronic | ICD-10-CM

## 2018-05-07 DIAGNOSIS — R55 SYNCOPE AND COLLAPSE: ICD-10-CM

## 2018-05-07 DIAGNOSIS — K92.2 GASTROINTESTINAL HEMORRHAGE, UNSPECIFIED: ICD-10-CM

## 2018-05-07 DIAGNOSIS — D64.9 ANEMIA, UNSPECIFIED: ICD-10-CM

## 2018-05-07 DIAGNOSIS — Z29.9 ENCOUNTER FOR PROPHYLACTIC MEASURES, UNSPECIFIED: ICD-10-CM

## 2018-05-07 DIAGNOSIS — H40.9 UNSPECIFIED GLAUCOMA: ICD-10-CM

## 2018-05-07 DIAGNOSIS — C25.9 MALIGNANT NEOPLASM OF PANCREAS, UNSPECIFIED: ICD-10-CM

## 2018-05-07 DIAGNOSIS — Z71.89 OTHER SPECIFIED COUNSELING: ICD-10-CM

## 2018-05-07 DIAGNOSIS — K83.1 OBSTRUCTION OF BILE DUCT: Chronic | ICD-10-CM

## 2018-05-07 DIAGNOSIS — E13.39: ICD-10-CM

## 2018-05-07 DIAGNOSIS — I10 ESSENTIAL (PRIMARY) HYPERTENSION: ICD-10-CM

## 2018-05-07 DIAGNOSIS — Z98.49 CATARACT EXTRACTION STATUS, UNSPECIFIED EYE: Chronic | ICD-10-CM

## 2018-05-07 PROBLEM — K59.03 DRUG-INDUCED CONSTIPATION: Status: ACTIVE | Noted: 2018-05-07

## 2018-05-07 LAB
ALBUMIN SERPL ELPH-MCNC: 3.5 G/DL — SIGNIFICANT CHANGE UP (ref 3.3–5)
ALBUMIN SERPL ELPH-MCNC: 3.7 G/DL — SIGNIFICANT CHANGE UP (ref 3.3–5)
ALP SERPL-CCNC: 101 U/L — SIGNIFICANT CHANGE UP (ref 40–120)
ALP SERPL-CCNC: 108 U/L — SIGNIFICANT CHANGE UP (ref 40–120)
ALT FLD-CCNC: 12 U/L — SIGNIFICANT CHANGE UP (ref 4–41)
ALT FLD-CCNC: 13 U/L — SIGNIFICANT CHANGE UP (ref 4–41)
APTT BLD: 29 SEC — SIGNIFICANT CHANGE UP (ref 27.5–37.4)
AST SERPL-CCNC: 17 U/L — SIGNIFICANT CHANGE UP (ref 4–40)
AST SERPL-CCNC: 19 U/L — SIGNIFICANT CHANGE UP (ref 4–40)
BASE EXCESS BLDV CALC-SCNC: 1.3 MMOL/L — SIGNIFICANT CHANGE UP
BASOPHILS # BLD AUTO: 0.01 K/UL — SIGNIFICANT CHANGE UP (ref 0–0.2)
BASOPHILS # BLD AUTO: 0.02 K/UL — SIGNIFICANT CHANGE UP (ref 0–0.2)
BASOPHILS NFR BLD AUTO: 0.1 % — SIGNIFICANT CHANGE UP (ref 0–2)
BASOPHILS NFR BLD AUTO: 0.2 % — SIGNIFICANT CHANGE UP (ref 0–2)
BILIRUB DIRECT SERPL-MCNC: 0.2 MG/DL — SIGNIFICANT CHANGE UP (ref 0.1–0.2)
BILIRUB SERPL-MCNC: 0.5 MG/DL — SIGNIFICANT CHANGE UP (ref 0.2–1.2)
BILIRUB SERPL-MCNC: 0.6 MG/DL — SIGNIFICANT CHANGE UP (ref 0.2–1.2)
BLD GP AB SCN SERPL QL: NEGATIVE — SIGNIFICANT CHANGE UP
BLOOD GAS VENOUS - CREATININE: 0.84 MG/DL — SIGNIFICANT CHANGE UP (ref 0.5–1.3)
BUN SERPL-MCNC: 18 MG/DL — SIGNIFICANT CHANGE UP (ref 7–23)
BUN SERPL-MCNC: 20 MG/DL — SIGNIFICANT CHANGE UP (ref 7–23)
CALCIUM SERPL-MCNC: 9 MG/DL — SIGNIFICANT CHANGE UP (ref 8.4–10.5)
CALCIUM SERPL-MCNC: 9 MG/DL — SIGNIFICANT CHANGE UP (ref 8.4–10.5)
CHLORIDE BLDV-SCNC: 106 MMOL/L — SIGNIFICANT CHANGE UP (ref 96–108)
CHLORIDE SERPL-SCNC: 101 MMOL/L — SIGNIFICANT CHANGE UP (ref 98–107)
CHLORIDE SERPL-SCNC: 104 MMOL/L — SIGNIFICANT CHANGE UP (ref 98–107)
CK MB BLD-MCNC: 1 NG/ML — SIGNIFICANT CHANGE UP (ref 1–6.6)
CK MB BLD-MCNC: SIGNIFICANT CHANGE UP (ref 0–2.5)
CK SERPL-CCNC: 54 U/L — SIGNIFICANT CHANGE UP (ref 30–200)
CO2 SERPL-SCNC: 25 MMOL/L — SIGNIFICANT CHANGE UP (ref 22–31)
CO2 SERPL-SCNC: 26 MMOL/L — SIGNIFICANT CHANGE UP (ref 22–31)
CREAT SERPL-MCNC: 0.81 MG/DL — SIGNIFICANT CHANGE UP (ref 0.5–1.3)
CREAT SERPL-MCNC: 0.86 MG/DL — SIGNIFICANT CHANGE UP (ref 0.5–1.3)
EOSINOPHIL # BLD AUTO: 0.15 K/UL — SIGNIFICANT CHANGE UP (ref 0–0.5)
EOSINOPHIL # BLD AUTO: 0.29 K/UL — SIGNIFICANT CHANGE UP (ref 0–0.5)
EOSINOPHIL NFR BLD AUTO: 1.5 % — SIGNIFICANT CHANGE UP (ref 0–6)
EOSINOPHIL NFR BLD AUTO: 2.4 % — SIGNIFICANT CHANGE UP (ref 0–6)
GAS PNL BLDV: 138 MMOL/L — SIGNIFICANT CHANGE UP (ref 136–146)
GLUCOSE BLDC GLUCOMTR-MCNC: 112 MG/DL — HIGH (ref 70–99)
GLUCOSE BLDC GLUCOMTR-MCNC: 80 MG/DL — SIGNIFICANT CHANGE UP (ref 70–99)
GLUCOSE BLDV-MCNC: 154 — HIGH (ref 70–99)
GLUCOSE SERPL-MCNC: 125 MG/DL — HIGH (ref 70–99)
GLUCOSE SERPL-MCNC: 153 MG/DL — HIGH (ref 70–99)
HAPTOGLOB SERPL-MCNC: 87 MG/DL — SIGNIFICANT CHANGE UP (ref 34–200)
HCO3 BLDV-SCNC: 24 MMOL/L — SIGNIFICANT CHANGE UP (ref 20–27)
HCT VFR BLD CALC: 21.2 % — LOW (ref 39–50)
HCT VFR BLD CALC: 23 % — LOW (ref 39–50)
HCT VFR BLD CALC: 23.9 % — LOW (ref 39–50)
HCT VFR BLDV CALC: 22.6 % — LOW (ref 39–51)
HGB BLD-MCNC: 6.7 G/DL — CRITICAL LOW (ref 13–17)
HGB BLD-MCNC: 7.2 G/DL — LOW (ref 13–17)
HGB BLD-MCNC: 7.6 G/DL — LOW (ref 13–17)
HGB BLDV-MCNC: 7.2 G/DL — LOW (ref 13–17)
IMM GRANULOCYTES # BLD AUTO: 0.04 # — SIGNIFICANT CHANGE UP
IMM GRANULOCYTES # BLD AUTO: 0.04 # — SIGNIFICANT CHANGE UP
IMM GRANULOCYTES NFR BLD AUTO: 0.3 % — SIGNIFICANT CHANGE UP (ref 0–1.5)
IMM GRANULOCYTES NFR BLD AUTO: 0.4 % — SIGNIFICANT CHANGE UP (ref 0–1.5)
INR BLD: 1.13 — SIGNIFICANT CHANGE UP (ref 0.88–1.17)
LACTATE BLDV-MCNC: 2.1 MMOL/L — HIGH (ref 0.5–2)
LACTATE SERPL-SCNC: 1.6 MMOL/L — SIGNIFICANT CHANGE UP (ref 0.5–2)
LDH SERPL L TO P-CCNC: 162 U/L — SIGNIFICANT CHANGE UP (ref 135–225)
LIDOCAIN IGE QN: 245.3 U/L — HIGH (ref 7–60)
LYMPHOCYTES # BLD AUTO: 2.4 K/UL — SIGNIFICANT CHANGE UP (ref 1–3.3)
LYMPHOCYTES # BLD AUTO: 24.4 % — SIGNIFICANT CHANGE UP (ref 13–44)
LYMPHOCYTES # BLD AUTO: 28.7 % — SIGNIFICANT CHANGE UP (ref 13–44)
LYMPHOCYTES # BLD AUTO: 3.47 K/UL — HIGH (ref 1–3.3)
MAGNESIUM SERPL-MCNC: 2 MG/DL — SIGNIFICANT CHANGE UP (ref 1.6–2.6)
MCHC RBC-ENTMCNC: 29 PG — SIGNIFICANT CHANGE UP (ref 27–34)
MCHC RBC-ENTMCNC: 29.1 PG — SIGNIFICANT CHANGE UP (ref 27–34)
MCHC RBC-ENTMCNC: 29.4 PG — SIGNIFICANT CHANGE UP (ref 27–34)
MCHC RBC-ENTMCNC: 31.3 % — LOW (ref 32–36)
MCHC RBC-ENTMCNC: 31.8 % — LOW (ref 32–36)
MCHC RBC-ENTMCNC: 32.1 % — SIGNIFICANT CHANGE UP (ref 32–36)
MCV RBC AUTO: 91.6 FL — SIGNIFICANT CHANGE UP (ref 80–100)
MCV RBC AUTO: 91.8 FL — SIGNIFICANT CHANGE UP (ref 80–100)
MCV RBC AUTO: 92.7 FL — SIGNIFICANT CHANGE UP (ref 80–100)
MONOCYTES # BLD AUTO: 1.1 K/UL — HIGH (ref 0–0.9)
MONOCYTES # BLD AUTO: 1.18 K/UL — HIGH (ref 0–0.9)
MONOCYTES NFR BLD AUTO: 11.2 % — SIGNIFICANT CHANGE UP (ref 2–14)
MONOCYTES NFR BLD AUTO: 9.7 % — SIGNIFICANT CHANGE UP (ref 2–14)
NEUTROPHILS # BLD AUTO: 6.13 K/UL — SIGNIFICANT CHANGE UP (ref 1.8–7.4)
NEUTROPHILS # BLD AUTO: 7.11 K/UL — SIGNIFICANT CHANGE UP (ref 1.8–7.4)
NEUTROPHILS NFR BLD AUTO: 58.7 % — SIGNIFICANT CHANGE UP (ref 43–77)
NEUTROPHILS NFR BLD AUTO: 62.4 % — SIGNIFICANT CHANGE UP (ref 43–77)
NRBC # FLD: 0 — SIGNIFICANT CHANGE UP
OB PNL STL: POSITIVE — SIGNIFICANT CHANGE UP
PCO2 BLDV: 54 MMHG — HIGH (ref 41–51)
PH BLDV: 7.32 PH — SIGNIFICANT CHANGE UP (ref 7.32–7.43)
PHOSPHATE SERPL-MCNC: 3.1 MG/DL — SIGNIFICANT CHANGE UP (ref 2.5–4.5)
PLATELET # BLD AUTO: 185 K/UL — SIGNIFICANT CHANGE UP (ref 150–400)
PLATELET # BLD AUTO: 203 K/UL — SIGNIFICANT CHANGE UP (ref 150–400)
PLATELET # BLD AUTO: 249 K/UL — SIGNIFICANT CHANGE UP (ref 150–400)
PMV BLD: 10.8 FL — SIGNIFICANT CHANGE UP (ref 7–13)
PMV BLD: 11 FL — SIGNIFICANT CHANGE UP (ref 7–13)
PMV BLD: 11 FL — SIGNIFICANT CHANGE UP (ref 7–13)
PO2 BLDV: < 24 MMHG — LOW (ref 35–40)
POTASSIUM BLDV-SCNC: 3.4 MMOL/L — SIGNIFICANT CHANGE UP (ref 3.4–4.5)
POTASSIUM SERPL-MCNC: 3.7 MMOL/L — SIGNIFICANT CHANGE UP (ref 3.5–5.3)
POTASSIUM SERPL-MCNC: 4 MMOL/L — SIGNIFICANT CHANGE UP (ref 3.5–5.3)
POTASSIUM SERPL-SCNC: 3.7 MMOL/L — SIGNIFICANT CHANGE UP (ref 3.5–5.3)
POTASSIUM SERPL-SCNC: 4 MMOL/L — SIGNIFICANT CHANGE UP (ref 3.5–5.3)
PROT SERPL-MCNC: 6.3 G/DL — SIGNIFICANT CHANGE UP (ref 6–8.3)
PROT SERPL-MCNC: 6.8 G/DL — SIGNIFICANT CHANGE UP (ref 6–8.3)
PROTHROM AB SERPL-ACNC: 13 SEC — SIGNIFICANT CHANGE UP (ref 9.8–13.1)
RBC # BLD: 2.31 M/UL — LOW (ref 4.2–5.8)
RBC # BLD: 2.48 M/UL — LOW (ref 4.2–5.8)
RBC # BLD: 2.61 M/UL — LOW (ref 4.2–5.8)
RBC # FLD: 15.6 % — HIGH (ref 10.3–14.5)
RBC # FLD: 16.2 % — HIGH (ref 10.3–14.5)
RBC # FLD: 16.3 % — HIGH (ref 10.3–14.5)
RH IG SCN BLD-IMP: POSITIVE — SIGNIFICANT CHANGE UP
SAO2 % BLDV: 23.2 % — LOW (ref 60–85)
SODIUM SERPL-SCNC: 140 MMOL/L — SIGNIFICANT CHANGE UP (ref 135–145)
SODIUM SERPL-SCNC: 142 MMOL/L — SIGNIFICANT CHANGE UP (ref 135–145)
TROPONIN T SERPL-MCNC: < 0.06 NG/ML — SIGNIFICANT CHANGE UP (ref 0–0.06)
TROPONIN T SERPL-MCNC: < 0.06 NG/ML — SIGNIFICANT CHANGE UP (ref 0–0.06)
WBC # BLD: 12.11 K/UL — HIGH (ref 3.8–10.5)
WBC # BLD: 8.63 K/UL — SIGNIFICANT CHANGE UP (ref 3.8–10.5)
WBC # BLD: 9.83 K/UL — SIGNIFICANT CHANGE UP (ref 3.8–10.5)
WBC # FLD AUTO: 12.11 K/UL — HIGH (ref 3.8–10.5)
WBC # FLD AUTO: 8.63 K/UL — SIGNIFICANT CHANGE UP (ref 3.8–10.5)
WBC # FLD AUTO: 9.83 K/UL — SIGNIFICANT CHANGE UP (ref 3.8–10.5)

## 2018-05-07 PROCEDURE — 99222 1ST HOSP IP/OBS MODERATE 55: CPT | Mod: GC

## 2018-05-07 PROCEDURE — 74178 CT ABD&PLV WO CNTR FLWD CNTR: CPT | Mod: 26

## 2018-05-07 PROCEDURE — 99497 ADVNCD CARE PLAN 30 MIN: CPT | Mod: 25

## 2018-05-07 PROCEDURE — 71046 X-RAY EXAM CHEST 2 VIEWS: CPT | Mod: 26

## 2018-05-07 PROCEDURE — 99223 1ST HOSP IP/OBS HIGH 75: CPT | Mod: GC

## 2018-05-07 RX ORDER — LISINOPRIL 2.5 MG/1
1 TABLET ORAL
Qty: 0 | Refills: 0 | COMMUNITY

## 2018-05-07 RX ORDER — INSULIN LISPRO 100/ML
VIAL (ML) SUBCUTANEOUS AT BEDTIME
Qty: 0 | Refills: 0 | Status: DISCONTINUED | OUTPATIENT
Start: 2018-05-07 | End: 2018-05-08

## 2018-05-07 RX ORDER — LATANOPROST 0.05 MG/ML
1 SOLUTION/ DROPS OPHTHALMIC; TOPICAL
Qty: 0 | Refills: 0 | COMMUNITY

## 2018-05-07 RX ORDER — DEXTROSE 50 % IN WATER 50 %
25 SYRINGE (ML) INTRAVENOUS ONCE
Qty: 0 | Refills: 0 | Status: DISCONTINUED | OUTPATIENT
Start: 2018-05-07 | End: 2018-05-08

## 2018-05-07 RX ORDER — DEXTROSE 50 % IN WATER 50 %
1 SYRINGE (ML) INTRAVENOUS ONCE
Qty: 0 | Refills: 0 | Status: DISCONTINUED | OUTPATIENT
Start: 2018-05-07 | End: 2018-05-08

## 2018-05-07 RX ORDER — TIMOLOL 0.5 %
1 DROPS OPHTHALMIC (EYE)
Qty: 0 | Refills: 0 | COMMUNITY

## 2018-05-07 RX ORDER — INSULIN LISPRO 100/ML
VIAL (ML) SUBCUTANEOUS
Qty: 0 | Refills: 0 | Status: DISCONTINUED | OUTPATIENT
Start: 2018-05-07 | End: 2018-05-08

## 2018-05-07 RX ORDER — LATANOPROST 0.05 MG/ML
1 SOLUTION/ DROPS OPHTHALMIC; TOPICAL AT BEDTIME
Qty: 0 | Refills: 0 | Status: DISCONTINUED | OUTPATIENT
Start: 2018-05-07 | End: 2018-05-10

## 2018-05-07 RX ORDER — DEXTROSE 50 % IN WATER 50 %
12.5 SYRINGE (ML) INTRAVENOUS ONCE
Qty: 0 | Refills: 0 | Status: DISCONTINUED | OUTPATIENT
Start: 2018-05-07 | End: 2018-05-08

## 2018-05-07 RX ORDER — GLUCAGON INJECTION, SOLUTION 0.5 MG/.1ML
1 INJECTION, SOLUTION SUBCUTANEOUS ONCE
Qty: 0 | Refills: 0 | Status: DISCONTINUED | OUTPATIENT
Start: 2018-05-07 | End: 2018-05-08

## 2018-05-07 RX ORDER — MIRTAZAPINE 45 MG/1
15 TABLET, ORALLY DISINTEGRATING ORAL AT BEDTIME
Qty: 0 | Refills: 0 | Status: DISCONTINUED | OUTPATIENT
Start: 2018-05-07 | End: 2018-05-10

## 2018-05-07 RX ORDER — SODIUM CHLORIDE 9 MG/ML
500 INJECTION INTRAMUSCULAR; INTRAVENOUS; SUBCUTANEOUS ONCE
Qty: 0 | Refills: 0 | Status: COMPLETED | OUTPATIENT
Start: 2018-05-07 | End: 2018-05-07

## 2018-05-07 RX ORDER — PANTOPRAZOLE SODIUM 20 MG/1
8 TABLET, DELAYED RELEASE ORAL
Qty: 80 | Refills: 0 | Status: DISCONTINUED | OUTPATIENT
Start: 2018-05-07 | End: 2018-05-09

## 2018-05-07 RX ORDER — MIRTAZAPINE 45 MG/1
1 TABLET, ORALLY DISINTEGRATING ORAL
Qty: 0 | Refills: 0 | COMMUNITY

## 2018-05-07 RX ORDER — SODIUM CHLORIDE 9 MG/ML
1000 INJECTION, SOLUTION INTRAVENOUS
Qty: 0 | Refills: 0 | Status: DISCONTINUED | OUTPATIENT
Start: 2018-05-07 | End: 2018-05-08

## 2018-05-07 RX ORDER — TIMOLOL 0.5 %
1 DROPS OPHTHALMIC (EYE)
Qty: 0 | Refills: 0 | Status: DISCONTINUED | OUTPATIENT
Start: 2018-05-07 | End: 2018-05-10

## 2018-05-07 RX ORDER — AMLODIPINE BESYLATE 2.5 MG/1
1 TABLET ORAL
Qty: 0 | Refills: 0 | COMMUNITY

## 2018-05-07 RX ADMIN — LATANOPROST 1 DROP(S): 0.05 SOLUTION/ DROPS OPHTHALMIC; TOPICAL at 23:01

## 2018-05-07 RX ADMIN — PANTOPRAZOLE SODIUM 10 MG/HR: 20 TABLET, DELAYED RELEASE ORAL at 14:30

## 2018-05-07 RX ADMIN — PANTOPRAZOLE SODIUM 10 MG/HR: 20 TABLET, DELAYED RELEASE ORAL at 22:47

## 2018-05-07 RX ADMIN — Medication 1 DROP(S): at 18:52

## 2018-05-07 RX ADMIN — SODIUM CHLORIDE 1000 MILLILITER(S): 9 INJECTION INTRAMUSCULAR; INTRAVENOUS; SUBCUTANEOUS at 14:29

## 2018-05-07 NOTE — H&P ADULT - PROBLEM SELECTOR PLAN 9
-Pt does not have insight into his prognosis.  -Ongoing goals of care discussion with pt and daughter.

## 2018-05-07 NOTE — H&P ADULT - ATTENDING COMMENTS
this unfortunate man with a recent dx of metastatic pancreatic cancer is undergoing palliative chemo when he presents with melena, then hematochezia-> syncope.  we suspect UGIB either peptic or erosion of his malignancy.    plan- crystalliods, PRBC,s PPI, GI for possible therapeutic EGD.  I discussed (16min) advanced directive with him including options if he should deteriorate. he would like us to advance this discussion with his daughter when she comes.

## 2018-05-07 NOTE — ED ADULT NURSE REASSESSMENT NOTE - NS ED NURSE REASSESS COMMENT FT1
spoke with hs 3 regarding prbs order, order to be changed to over three hours instead of afap. will make receiving rn aware

## 2018-05-07 NOTE — DISCHARGE NOTE ADULT - CARE PROVIDER_API CALL
Josefina Wagner (DO), HematologyOncology; Internal Medicine  [address] [place] [address]  Phone: [phone]  Fax: [phone]

## 2018-05-07 NOTE — H&P ADULT - NSHPPHYSICALEXAM_GEN_ALL_CORE
GENERAL: NAD, cachectic  HEAD:  Atraumatic, Normocephalic  EYES: EOMI, , conjunctiva and sclera clear  NECK: Supple, No JVD  CHEST/LUNG: Clear to auscultation bilaterally; No wheeze  HEART: Regular rate and rhythm; No murmurs, rubs, or gallops  ABDOMEN: Soft, +nodular masses noted on abdomen. Nontender, Nondistended; Bowel sounds present  EXTREMITIES:  2+ Peripheral Pulses, No clubbing, cyanosis, or edema  PSYCH: AAOx3  NEUROLOGY: non-focal  SKIN: No rashes or lesions

## 2018-05-07 NOTE — CONSULT NOTE ADULT - ASSESSMENT
71 yo M known to have a hx of pancreatic ca with a possible communication with the duodenal bulb presenting for bright red blood per rectum, melena, and acute profound symptomatic microcytic anemia.  Cannot rule out UGIB vs LGIB.  Possible etiology: PUD, pseudoaneurysmal bleeding (in light of the pancreatic ca/ communication), variceal bleeding (?gastric?), AVMs. vs more common causes of LGIB (diverticular, AVMs, etc..)    Rec:  - resuscitate and check hgb  - PPI drip  - 73 yo M known to have a hx of pancreatic ca with a possible communication with the duodenal bulb presenting for bright red blood per rectum, melena, and acute profound symptomatic microcytic anemia.  Cannot rule out UGIB vs LGIB.  Possible etiology: PUD, pseudoaneurysmal bleeding (in light of the pancreatic ca/ communication), variceal bleeding (?gastric?), AVMs. vs more common causes of LGIB (diverticular, AVMs, etc..)    Rec:  - resuscitate and check hgb  - PPI drip  - ICU monitoring 73 yo M known to have a hx of pancreatic ca with a possible communication with the duodenal bulb presenting for bright red blood per rectum, melena, and acute profound symptomatic microcytic anemia.  Cannot rule out UGIB vs LGIB.  Possible etiology: PUD, pseudoaneurysmal bleeding (in light of the pancreatic ca/ communication), variceal bleeding (?gastric?), AVMs. vs more common causes of LGIB (diverticular, AVMs, etc..)    Rec:  - resuscitate and check hgb  - PPI drip  - ICU monitoring  - CTA and IR eval  - NPO for now- EGD in the AM or earlier if HD unstable or develops massive GIB hematemesis continuous hematochezia) AFTER adequate resuscitation. 73 yo M known to have a hx of pancreatic ca with a possible communication with the duodenal bulb presenting for bright red blood per rectum, melena, and acute profound symptomatic anemia.  Cannot rule out UGIB vs LGIB.  Possible etiology: PUD, pseudoaneurysmal bleeding (in light of the pancreatic ca/ communication), variceal bleeding (?gastric?), AVMs. vs more common causes of LGIB (diverticular, AVMs, etc..)    Rec:  - resuscitate and check hgb  - PPI drip  - ICU monitoring  - CTA and IR eval  - NPO for now- EGD in the AM or earlier if HD unstable or develops massive GIB hematemesis continuous hematochezia) AFTER adequate resuscitation.
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
## 2018-05-07 NOTE — ED PROVIDER NOTE - CARE PLAN
Principal Discharge DX:	GI bleed Principal Discharge DX:	GI bleed  Secondary Diagnosis:	Pancreatic cancer

## 2018-05-07 NOTE — DISCHARGE NOTE ADULT - ADDITIONAL INSTRUCTIONS
1. Follow up with radiation oncology tomorrow at 11am. It will be at [facility]. When you approach the [facility]'s [place] (building with the glassy exterior), look towards the left of it and locate a LITTLE BROWN AWNING. Go into that building and go down to C level. Make a LEFT and follow the signs to Radiation. If you get lost, call [phone].  2. Follow up with Dr. Wagner to continue your palliative chemotherapy. 1. Follow up with radiation oncology tomorrow at 11am. It will be at [facility]. When you approach the [facility]'s [place] (building with the glassy exterior), look towards the left of it and locate a LITTLE BROWN AWNING. Go into that building and go down to C level. Make a LEFT and follow the signs to Radiation. If you get lost, call [phone].  2. Follow up with Dr. Wagner to continue your palliative chemotherapy.  3. Please take the colace and docusate once a day for constipation.  4. Please take the oxycodone every 4 to 6 hours for pain as needed.  5. Please take pantoprazole once per day 30 minutes before eating.

## 2018-05-07 NOTE — ED PROVIDER NOTE - ATTENDING CONTRIBUTION TO CARE
72M h/o metastatic pancreatic cancer, DM, HTN presents for evaluation of bloody BM. Describes dark diarrhea and large amount of bright red blood. Immediately afterwards developed lightheadedness and near syncope, fell to ground but denies LOC. No CP/SOB. No prior h/o GIB. Currently on chemotherapy for cancer, no anticoagulants. On exam well appearing, nad, lungs clear, rrr, abd soft, 2+ pulses, no edema, no rash, speech clear.  Rectal with dried blood. + worsening anemia, will admit for GI consult and trend hct for potential transfusion.

## 2018-05-07 NOTE — ED PROVIDER NOTE - PROGRESS NOTE DETAILS
[facility] was paged, spoke to Luly Guajardo to make the oncology service aware of patient and assess if anemia is more likely from bleed vs chemo, suspect to be from bleed.

## 2018-05-07 NOTE — ED PROVIDER NOTE - OBJECTIVE STATEMENT
71 Y/O M admits to PMH of HTN, DM, Pancreatic Cancer, prostate cancer C/O Bright red blood CT and "dark diarrhea" after straining for stool this morning around 830 AM. He states it was a "good amount of blood" states he was lightheaded after the BM, still notes some dizziness. States he fell to the floor at one point but states he remembers the whole episode, denies head injury. He states he was nauseous last night which resolved but denies abdominal pain, vomiting or any other symptoms such as CP, SOB, ABD PN.

## 2018-05-07 NOTE — H&P ADULT - PROBLEM SELECTOR PLAN 1
-Given previous CT findings, concerning for erosion of mass into duodenum vs gastric ulcer (less likely) vs variceal bleed due to portal hypertension as patient reporting melena as well as BRB.  -GI consulted.  -PPI drip for now, to be converted to pushes later.  -1 unit prbcs ordered as patient is having symptomatic anemia.  -IVF  -No DVT prophylaxis in the setting of GIB  -NPO for now unless GI decides not to intervene currently

## 2018-05-07 NOTE — ED ADULT TRIAGE NOTE - CHIEF COMPLAINT QUOTE
c/o generalized weakness today followed by bright red and black  bowel mov't.  denies abd pain. denies blood thinner use.  h/o pancreatic cancer currently on chemo.

## 2018-05-07 NOTE — ED PROVIDER NOTE - CONSTITUTIONAL, MLM
normal... Well appearing, well nourished, awake, alert, oriented to person, place, time/situation and in no imminent distress.

## 2018-05-07 NOTE — ED PROVIDER NOTE - MEDICAL DECISION MAKING DETAILS
73 Y/O M admits to PMH of HTN, DM, Pancreatic Cancer, prostate cancer C/O Bright red blood [place] and "dark diarrhea" after straining for stool this morning around 830 AM. He has no history of this, H and H decreased from prior visits, patient is also on active chemotherapy. Notes some weakness, no other shortness of breath, CP or any other symptoms at this time. Possible diverticulosis ediology, will admit to medicine for GI f/u, repeat CBC and GI follow up.

## 2018-05-07 NOTE — DISCHARGE NOTE ADULT - MEDICATION SUMMARY - MEDICATIONS TO TAKE
I will START or STAY ON the medications listed below when I get home from the hospital:    oxyCODONE 5 mg oral tablet  -- 1-2 tab(s) by mouth every 4 to 6 hours, As Needed -Moderate to severe  Pain (4 - 10) MDD:4   -- Indication: For Pain, neoplasm-related    lisinopril 5 mg oral tablet  -- 1 tab(s) by mouth once a day in pm  -- Indication: For Blood pressure    mirtazapine 15 mg oral tablet  -- 1 tab(s) by mouth once a day (at bedtime)  -- Indication: For Depression    docusate sodium 100 mg oral capsule  -- 1 cap(s) by mouth once a day   -- Indication: For Constipation    senna oral tablet  -- 1 tab(s) by mouth once a day (at bedtime)  -- Indication: For Constipation    timolol hemihydrate 0.5% ophthalmic solution  -- 1 dose(s) to each affected eye (R) once a day (at bedtime)  -- Indication: For Glaucoma of right eye, unspecified glaucoma type    latanoprost 0.005% ophthalmic solution  -- 1 drop(s) in the right eye once a day (in the evening)  -- Indication: For Glaucoma of right eye, unspecified glaucoma type    Protonix 40 mg oral delayed release tablet  -- 1 tab(s) by mouth once a day before breakfast  -- Indication: For Gastrointestinal hemorrhage

## 2018-05-07 NOTE — DISCHARGE NOTE ADULT - CARE PLAN
Principal Discharge DX:	Gastrointestinal hemorrhage, unspecified gastrointestinal hemorrhage type  Secondary Diagnosis:	Malignant neoplasm of pancreas, unspecified location of malignancy  Assessment and plan of treatment:	Please follow up with Dr. Wagner as previously scheduled. Principal Discharge DX:	Gastrointestinal hemorrhage, unspecified gastrointestinal hemorrhage type  Goal:	To follow up  Assessment and plan of treatment:	You came in and were found to have bleeding from your tumor as it has invaded your duodenum, which is part of your gastrointestinal tract. You had an endoscopy and an ERCP done, which demonstrated this, but they were not able to stop the bleeding. Radiation oncology was called and they offered you palliative radiation in an effort to stop the bleeding. Please follow up with radiation tomorrow at 11am.   Unfortunately, the radiation is not guaranteed to work and you may continue to bleed.    Follow up with radiation oncology tomorrow at 11am. It will be at [facility]. When you approach the [facility] (building with the glassy exterior), look towards the left of it and locate a LITTLE BROWN AWNING. Go into that building and go down to C level. Make a LEFT and follow the signs to Radiation. If you get lost, call [phone].  Secondary Diagnosis:	Malignant neoplasm of pancreas, unspecified location of malignancy  Assessment and plan of treatment:	Please follow up with Dr. Wagner as previously scheduled for your palliative chemotherapy.

## 2018-05-07 NOTE — CONSULT NOTE ADULT - ASSESSMENT
Mr. Kirkpatrick, is a 71 y/o M, with h/o metastatic pancreatic adenocarcinoma on palliative chemotherapy with gemcitabine and Abraxane, Q 2 weeks,  C2 on 4/23, admitted with symptomatic anemia and BRBPR.

## 2018-05-07 NOTE — H&P ADULT - NSHPLABSRESULTS_GEN_ALL_CORE
(05-07 @ 09:55)                      7.2  12.11 )-----------( 249                 23.0    Neutrophils = 7.11 (58.7%)  Lymphocytes = 3.47 (28.7%)  Eosinophils = 0.29 (2.4%)  Basophils = 0.02 (0.2%)  Monocytes = 1.18 (9.7%)  Bands = --%    05-07    140  |  101  |  20  ----------------------------<  153<H>  3.7   |  25  |  0.86    Ca    9.0      07 May 2018 09:55    TPro  6.8  /  Alb  3.7  /  TBili  0.6  /  DBili  x   /  AST  19  /  ALT  13  /  AlkPhos  108  05-07    ( 07 May 2018 09:55 )   PT: 13.0 SEC;   INR: 1.13 ;       PTT:29.0 SEC  CARDIAC MARKERS ( 07 May 2018 09:55 )  Trop < 0.06 ng/mL / CK x     / CKMB x           RVP:    Venous Blood Gas:  05-07 @ 09:55  7.32/54/< 24/24/23.2  VBG Lactate: 2.1        Tox:

## 2018-05-07 NOTE — DISCHARGE NOTE ADULT - PATIENT PORTAL LINK FT
You can access the [facility] Patient Portal, offered by [facility], by registering with the following website: [email or web address]

## 2018-05-07 NOTE — DISCHARGE NOTE ADULT - HOSPITAL COURSE
71 Y/O M admits to [facility] of HTN, DM, newly diagnosed metastatic pancreatic adenocarcinoma who initially presented with obstruction s/p ERCP and biliary stent placement on 1/23/18- unresectable secondary to hepatic metastasis currently on palliative chemotherapy with gemcitabine and Abraxane, Q 2 weeks,  C2 on 4/23 , prostate cancer s/p prostatectomy in 2009, presents with  Bright red blood [place] and "dark diarrhea." Patient reports that he initially felt the urge to defecate and had some small dark stool. Then he went to brush his teeth and had another urge with lower abdominal pain that radiated to the back. When he sat on the toilet he said he had a lot of bright red blood as well as dark stool. After that, he got up, felt dizzy and fell to the floor, unwitnessed. Got up by himself after. Does not know if he hit his head. Patient reports nausea last night without vomiting. He has never had bleeding like this, but has noted some streaks of blood on tissue paper in the past. Has not had a colonoscopy. He denies any chest pain, shortness of breath. Hematology saw patient at bedside; no recommendations were made at this time. Patient was started on IV protonix, received 1 unit PRBCs for symptomatic anemia, and was started on NS. GI was consulted. Goals of care conversation was begun, but patient seemed to have very little insight into grim prognosis. 71 Y/O M admits to [facility] of HTN, DM, newly diagnosed metastatic pancreatic adenocarcinoma who initially presented with obstruction s/p ERCP and biliary stent placement on 1/23/18- unresectable secondary to hepatic metastasis currently on palliative chemotherapy with gemcitabine and Abraxane, Q 2 weeks,  C2 on 4/23 , prostate cancer s/p prostatectomy in 2009, presents with  Bright red blood [place] and "dark diarrhea." Patient reports that he initially felt the urge to defecate and had some small dark stool. Then he went to brush his teeth and had another urge with lower abdominal pain that radiated to the back. When he sat on the toilet he said he had a lot of bright red blood as well as dark stool. After that, he got up, felt dizzy and fell to the floor, unwitnessed. Got up by himself after. Does not know if he hit his head. Patient reports nausea last night without vomiting. He has never had bleeding like this, but has noted some streaks of blood on tissue paper in the past. Has not had a colonoscopy. He denies any chest pain, shortness of breath. Hematology saw patient at bedside; no recommendations were made at this time. Patient was started on IV protonix, received 1 unit PRBCs for symptomatic anemia, and was started on NS. GI was consulted. Goals of care conversation was begun, but patient seemed to have very little insight into grim prognosis. Overnight, patient required 1 more unit pRBCs for 2 units total overnight with stable hemoglobin. Patient was started on protonix drip and IVF. CT angiogram demonstrated no active bleeding, but possible invasion of pancreatic head mass into duodenum. On second day of admission, patient underwent EGD today which showed.... 73 Y/O M admits to [facility] of HTN, DM, newly diagnosed metastatic pancreatic adenocarcinoma who initially presented with obstruction s/p ERCP and biliary stent placement on 1/23/18- unresectable secondary to hepatic metastasis currently on palliative chemotherapy with gemcitabine and Abraxane, Q 2 weeks,  C2 on 4/23 , prostate cancer s/p prostatectomy in 2009, presents with  Bright red blood [place] and "dark diarrhea." Patient reports that he initially felt the urge to defecate and had some small dark stool. Then he went to brush his teeth and had another urge with lower abdominal pain that radiated to the back. When he sat on the toilet he said he had a lot of bright red blood as well as dark stool. After that, he got up, felt dizzy and fell to the floor, unwitnessed. Got up by himself after. Does not know if he hit his head. Patient reports nausea last night without vomiting. He has never had bleeding like this, but has noted some streaks of blood on tissue paper in the past. Has not had a colonoscopy. He denies any chest pain, shortness of breath. Hematology saw patient at bedside; no recommendations were made at this time. Patient was started on IV protonix, received 1 unit PRBCs for symptomatic anemia, and was started on NS. GI was consulted. Goals of care conversation was begun, but patient seemed to have very little insight into grim prognosis. Overnight, patient required 1 more unit pRBCs for 2 units total overnight with stable hemoglobin. Patient was started on protonix drip and IVF. CT angiogram demonstrated no active bleeding, but possible invasion of pancreatic head mass into duodenum. On second day of admission, patient underwent EGD today which showed Friable ulcerated mucosa noted in the duodenum at the    ampulla and site of the metal biliary stent. Findings c/w tumor invasion in the duodenal wall. He had an ERCP the next day, which demonstrated 1) Likely bleeding from tumor invasion into the duodenum - not amenable to endoscopic therapies. 2) CBD stent - not removed given unable to advance the ERCP endoscope into the 2nd portion of the duodenum. Oncology consulted radiation oncology, and patient was mapped out for radiation. 73 Y/O M admits to [facility] of HTN, DM, newly diagnosed metastatic pancreatic adenocarcinoma who initially presented with obstruction s/p ERCP and biliary stent placement on 1/23/18- unresectable secondary to hepatic metastasis currently on palliative chemotherapy with gemcitabine and Abraxane, Q 2 weeks,  C2 on 4/23 , prostate cancer s/p prostatectomy in 2009, presents with  Bright red blood CT and "dark diarrhea." Patient reports that he initially felt the urge to defecate and had some small dark stool. Then he went to brush his teeth and had another urge with lower abdominal pain that radiated to the back. When he sat on the toilet he said he had a lot of bright red blood as well as dark stool. After that, he got up, felt dizzy and fell to the floor, unwitnessed. Got up by himself after. Does not know if he hit his head. Patient reports nausea last night without vomiting. He has never had bleeding like this, but has noted some streaks of blood on tissue paper in the past. Has not had a colonoscopy. He denies any chest pain, shortness of breath. Hematology saw patient at bedside; no recommendations were made at this time. Patient was started on IV protonix, received 1 unit PRBCs for symptomatic anemia, and was started on NS. GI was consulted. Goals of care conversation was begun, but patient seemed to have very little insight into grim prognosis. Overnight, patient required 1 more unit pRBCs for 2 units total overnight with stable hemoglobin. Patient was started on protonix drip and IVF. CT angiogram demonstrated no active bleeding, but possible invasion of pancreatic head mass into duodenum. On second day of admission, patient underwent EGD today which showed Friable ulcerated mucosa noted in the duodenum at the    ampulla and site of the metal biliary stent. Findings c/w tumor invasion in the duodenal wall. He had an ERCP the next day, which demonstrated 1) Likely bleeding from tumor invasion into the duodenum - not amenable to endoscopic therapies. 2) CBD stent - not removed given unable to advance the ERCP endoscope into the 2nd portion of the duodenum. Oncology consulted radiation oncology, and patient was mapped out for radiation. He underwent his first radiation session while in hospital. His hemoglobin and hematocrit remained stable and patient was deemed stable for discharge. Palliative care also evaluated the patient and submitted their recommendations. I spoke to patient's daughter who verbalized understanding of patient's illness and prognosis. 71 Y/O M admits to [facility] of HTN, DM, newly diagnosed metastatic pancreatic adenocarcinoma who initially presented with obstruction s/p ERCP and biliary stent placement on 1/23/18- unresectable secondary to hepatic metastasis currently on palliative chemotherapy with gemcitabine and Abraxane, Q 2 weeks,  C2 on 4/23 , prostate cancer s/p prostatectomy in 2009, presents with  Bright red blood [place] and "dark diarrhea." Patient reports that he initially felt the urge to defecate and had some small dark stool. Then he went to brush his teeth and had another urge with lower abdominal pain that radiated to the back. When he sat on the toilet he said he had a lot of bright red blood as well as dark stool. After that, he got up, felt dizzy and fell to the floor, unwitnessed. Got up by himself after. Does not know if he hit his head. Patient reports nausea last night without vomiting. He has never had bleeding like this, but has noted some streaks of blood on tissue paper in the past. Has not had a colonoscopy. He denies any chest pain, shortness of breath. Hematology saw patient at bedside; no recommendations were made at this time. Patient was started on IV protonix, received 1 unit PRBCs for symptomatic anemia, and was started on NS. GI was consulted. Goals of care conversation was begun, but patient seemed to have very little insight into grim prognosis. Overnight, patient required 1 more unit pRBCs for 2 units total overnight with stable hemoglobin. Patient was started on protonix drip and IVF. CT angiogram demonstrated no active bleeding, but possible invasion of pancreatic head mass into duodenum. On second day of admission, patient underwent EGD today which showed Friable ulcerated mucosa noted in the duodenum at the    ampulla and site of the metal biliary stent. Findings c/w tumor invasion in the duodenal wall. He had an ERCP the next day, which demonstrated 1) Likely bleeding from tumor invasion into the duodenum - not amenable to endoscopic therapies. 2) CBD stent - not removed given unable to advance the ERCP endoscope into the 2nd portion of the duodenum. Oncology consulted radiation oncology, and patient was mapped out for radiation. He underwent his first radiation session while in hospital. His hemoglobin and hematocrit remained stable and patient was deemed stable for discharge. Palliative care also evaluated the patient and submitted their recommendations. I spoke to patient's daughter who verbalized understanding of patient's illness and prognosis. Dr. Wagner, patient's oncologist, also saw the patient at bedside. She stated that she has had several conversations with him regarding the prognosis and that he verbalized that if the treatments were not helping, he would want to go back home to [place] where his family is. Oxycodone, docusate, senna, and pantoprazole medications were sent to his pharmacy.   I spoke to patient's  to see if a ride can be set up for the radiation treatments. Second/5 treatments is day after discharge at 11am. 71 Y/O M admits to [facility] of HTN, DM, newly diagnosed metastatic pancreatic adenocarcinoma who initially presented with obstruction s/p ERCP and biliary stent placement on 1/23/18- unresectable secondary to hepatic metastasis currently on palliative chemotherapy with gemcitabine and Abraxane, Q 2 weeks,  C2 on 4/23 , prostate cancer s/p prostatectomy in 2009, presents with  Bright red blood [place] and "dark diarrhea." Patient reports that he initially felt the urge to defecate and had some small dark stool. Then he went to brush his teeth and had another urge with lower abdominal pain that radiated to the back. When he sat on the toilet he said he had a lot of bright red blood as well as dark stool. After that, he got up, felt dizzy and fell to the floor, unwitnessed. Got up by himself after. Does not know if he hit his head. Patient reports nausea last night without vomiting. He has never had bleeding like this, but has noted some streaks of blood on tissue paper in the past. Has not had a colonoscopy. He denies any chest pain, shortness of breath. Hematology saw patient at bedside; no recommendations were made at this time. Patient was started on IV protonix, received 1 unit PRBCs for symptomatic anemia, and was started on NS. GI was consulted. Goals of care conversation was begun, but patient seemed to have very little insight into grim prognosis. Overnight, patient required 1 more unit pRBCs for 2 units total overnight with stable hemoglobin. Patient was started on protonix drip and IVF. CT angiogram demonstrated no active bleeding, but possible invasion of pancreatic head mass into duodenum. On second day of admission, patient underwent EGD today which showed Friable ulcerated mucosa noted in the duodenum at the    ampulla and site of the metal biliary stent. Findings c/w tumor invasion in the duodenal wall. He had an ERCP the next day, which demonstrated 1) Likely bleeding from tumor invasion into the duodenum - not amenable to endoscopic therapies. 2) CBD stent - not removed given unable to advance the ERCP endoscope into the 2nd portion of the duodenum. Oncology consulted radiation oncology, and patient was mapped out for radiation. He underwent his first radiation session while in hospital. His hemoglobin and hematocrit remained stable and patient was deemed stable for discharge. Palliative care also evaluated the patient and submitted their recommendations. I spoke to patient's daughter who verbalized understanding of patient's illness and prognosis. Dr. Wagner, patient's oncologist, also saw the patient at bedside. She stated that she has had several conversations with him regarding the prognosis and that he verbalized that if the treatments were not helping, he would want to go back home to [place] where his family is. Oxycodone, docusate, senna, and pantoprazole medications were sent to his pharmacy.   I spoke to patient's  to see if a ride can be set up for the radiation treatments. Second/5 treatments is day after discharge at 11am.     medicine attending addendum- this unfortunate man with recently diagnosed pancreatic cancer with mets to the liver and biliary stent placed last admission, now p/w UGIB. EGD revealed oozing from friable malig mass about the stent, no intervention was possible, so palliative RT was initiated. his counts and hemodynamics remain stable so he will complete his RT as an outpt. we discussed goals of care but he wanted to defer this discussion and all decisions to his daughter.    time spent on dc- 35 min

## 2018-05-07 NOTE — ED ADULT NURSE REASSESSMENT NOTE - NS ED NURSE REASSESS COMMENT FT1
hs 3 called regarding prbc order. pt vitally stable at this time, nad noted. vs as stated. resps even and unlabored. pt denies complaints of pain, dizziness, cp or sob.

## 2018-05-07 NOTE — ED ADULT NURSE NOTE - OBJECTIVE STATEMENT
pt received to room 1 alert and oriented x4. ambulatory. pt has a pmhx of pancreatic cancer on iv chemo. last chemo 2 weeks ago. pt c.o straining to have a BM this morning w 1 episode of watery bloody ,black stool.  pt also states he had a near syncopal episode after straining and having bm. denies any head trauma or loc. pt states he also had one episode of nausea last night . pt denies any chest pain. sob ,n/v,dizziness,lightheadness, abdominal pain at the moment. abdomen is soft and non tender. pt placed on continuous TELE monitoring. nsr on cm vss. 20 g placed in left a/c. labs drawn and sent. will continue to monitor.
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
## 2018-05-07 NOTE — DISCHARGE NOTE ADULT - PLAN OF CARE
Please follow up with Dr. Wagner as previously scheduled. To follow up You came in and were found to have bleeding from your tumor as it has invaded your duodenum, which is part of your gastrointestinal tract. You had an endoscopy and an ERCP done, which demonstrated this, but they were not able to stop the bleeding. Radiation oncology was called and they offered you palliative radiation in an effort to stop the bleeding. Please follow up with radiation tomorrow at 11am.   Unfortunately, the radiation is not guaranteed to work and you may continue to bleed.    Follow up with radiation oncology tomorrow at 11am. It will be at [facility]. When you approach the [employer] (building with the glassy exterior), look towards the left of it and locate a [employer]. Go into that building and go down to C level. Make a LEFT and follow the signs to Radiation. If you get lost, call [phone]. Please follow up with Dr. Wagner as previously scheduled for your palliative chemotherapy.

## 2018-05-07 NOTE — H&P ADULT - ASSESSMENT
72 year old M, PMH DM, HTN, glaucoma, prostate ca and metastatic pancreatic CA presents with BRBPR concerning for UGIB and possible erosion into the duodenum, currently hemodynamically stable.

## 2018-05-07 NOTE — H&P ADULT - PROBLEM SELECTOR PROBLEM 6
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

## 2018-05-07 NOTE — H&P ADULT - HISTORY OF PRESENT ILLNESS
71 Y/O M admits to [facility] of HTN, DM, Pancreatic Cancer, prostate cancer C/O Bright red blood MA and "dark diarrhea" after straining for stool this morning around 830 AM. He states it was a "good amount of blood" states he was lightheaded after the BM, still notes some dizziness. States he fell to the floor at one point but states he remembers the whole episode, denies head injury. He states he was nauseous last night which resolved but denies abdominal pain, vomiting or any other symptoms such as CP, SOB, ABD PN. 73 Y/O M admits to [facility] of HTN, DM, newly diagnosed metastatic pancreatic adenocarcinoma who initially presented with obstruction s/p ERCP and biliary stent placement on 1/23/18- unresectable secondary to hepatic metastasis currently on palliative chemotherapy with gemcitabine and Abraxane, Q 2 weeks,  C2 on 4/23 , prostate cancer s/p prostatectomy in 2009, presents with  Bright red blood [place] and "dark diarrhea." Patient reports that he initially felt the urge to defecate and had some small dark stool. Then he went to brush his teeth and had another urge with lower abdominal pain that radiated to the back. When he sat on the toilet he said he had a lot of bright red blood as well as dark stool. After that, he got up, felt dizzy and fell to the floor, unwitnessed. Got up by himself after. Does not know if he hit his head. Patient reports nausea last night without vomiting. He has never had bleeding like this, but has noted some streaks of blood on tissue paper in the past. Has not had a colonoscopy. He denies any chest pain, shortness of breath,

## 2018-05-08 LAB
ALBUMIN SERPL ELPH-MCNC: 2.9 G/DL — LOW (ref 3.3–5)
ALBUMIN SERPL ELPH-MCNC: 3.3 G/DL — SIGNIFICANT CHANGE UP (ref 3.3–5)
ALP SERPL-CCNC: 109 U/L — SIGNIFICANT CHANGE UP (ref 40–120)
ALP SERPL-CCNC: 95 U/L — SIGNIFICANT CHANGE UP (ref 40–120)
ALT FLD-CCNC: 14 U/L — SIGNIFICANT CHANGE UP (ref 4–41)
ALT FLD-CCNC: 15 U/L — SIGNIFICANT CHANGE UP (ref 4–41)
AST SERPL-CCNC: 16 U/L — SIGNIFICANT CHANGE UP (ref 4–40)
AST SERPL-CCNC: 20 U/L — SIGNIFICANT CHANGE UP (ref 4–40)
BASE EXCESS BLDV CALC-SCNC: 1.5 MMOL/L — SIGNIFICANT CHANGE UP
BILIRUB SERPL-MCNC: 0.7 MG/DL — SIGNIFICANT CHANGE UP (ref 0.2–1.2)
BILIRUB SERPL-MCNC: 0.9 MG/DL — SIGNIFICANT CHANGE UP (ref 0.2–1.2)
BLOOD GAS VENOUS - CREATININE: 0.89 MG/DL — SIGNIFICANT CHANGE UP (ref 0.5–1.3)
BUN SERPL-MCNC: 15 MG/DL — SIGNIFICANT CHANGE UP (ref 7–23)
BUN SERPL-MCNC: 15 MG/DL — SIGNIFICANT CHANGE UP (ref 7–23)
CALCIUM SERPL-MCNC: 8.9 MG/DL — SIGNIFICANT CHANGE UP (ref 8.4–10.5)
CALCIUM SERPL-MCNC: 9.3 MG/DL — SIGNIFICANT CHANGE UP (ref 8.4–10.5)
CHLORIDE BLDV-SCNC: 110 MMOL/L — HIGH (ref 96–108)
CHLORIDE SERPL-SCNC: 104 MMOL/L — SIGNIFICANT CHANGE UP (ref 98–107)
CHLORIDE SERPL-SCNC: 106 MMOL/L — SIGNIFICANT CHANGE UP (ref 98–107)
CK MB BLD-MCNC: 1 NG/ML — SIGNIFICANT CHANGE UP (ref 1–6.6)
CK SERPL-CCNC: 70 U/L — SIGNIFICANT CHANGE UP (ref 30–200)
CO2 SERPL-SCNC: 21 MMOL/L — LOW (ref 22–31)
CO2 SERPL-SCNC: 24 MMOL/L — SIGNIFICANT CHANGE UP (ref 22–31)
CREAT SERPL-MCNC: 0.88 MG/DL — SIGNIFICANT CHANGE UP (ref 0.5–1.3)
CREAT SERPL-MCNC: 0.91 MG/DL — SIGNIFICANT CHANGE UP (ref 0.5–1.3)
GAS PNL BLDV: 141 MMOL/L — SIGNIFICANT CHANGE UP (ref 136–146)
GLUCOSE BLDC GLUCOMTR-MCNC: 101 MG/DL — HIGH (ref 70–99)
GLUCOSE BLDC GLUCOMTR-MCNC: 73 MG/DL — SIGNIFICANT CHANGE UP (ref 70–99)
GLUCOSE BLDC GLUCOMTR-MCNC: 82 MG/DL — SIGNIFICANT CHANGE UP (ref 70–99)
GLUCOSE BLDC GLUCOMTR-MCNC: 89 MG/DL — SIGNIFICANT CHANGE UP (ref 70–99)
GLUCOSE BLDC GLUCOMTR-MCNC: 98 MG/DL — SIGNIFICANT CHANGE UP (ref 70–99)
GLUCOSE BLDV-MCNC: 80 — SIGNIFICANT CHANGE UP (ref 70–99)
GLUCOSE SERPL-MCNC: 77 MG/DL — SIGNIFICANT CHANGE UP (ref 70–99)
GLUCOSE SERPL-MCNC: 86 MG/DL — SIGNIFICANT CHANGE UP (ref 70–99)
HBA1C BLD-MCNC: 5.3 % — SIGNIFICANT CHANGE UP (ref 4–5.6)
HCO3 BLDV-SCNC: 25 MMOL/L — SIGNIFICANT CHANGE UP (ref 20–27)
HCT VFR BLD CALC: 26.9 % — LOW (ref 39–50)
HCT VFR BLD CALC: 30.4 % — LOW (ref 39–50)
HCT VFR BLDV CALC: 30.8 % — LOW (ref 39–51)
HGB BLD-MCNC: 10 G/DL — LOW (ref 13–17)
HGB BLD-MCNC: 8.9 G/DL — LOW (ref 13–17)
HGB BLDV-MCNC: 9.9 G/DL — LOW (ref 13–17)
LACTATE BLDV-MCNC: 1.4 MMOL/L — SIGNIFICANT CHANGE UP (ref 0.5–2)
MAGNESIUM SERPL-MCNC: 2 MG/DL — SIGNIFICANT CHANGE UP (ref 1.6–2.6)
MAGNESIUM SERPL-MCNC: 2.1 MG/DL — SIGNIFICANT CHANGE UP (ref 1.6–2.6)
MCHC RBC-ENTMCNC: 29.6 PG — SIGNIFICANT CHANGE UP (ref 27–34)
MCHC RBC-ENTMCNC: 29.9 PG — SIGNIFICANT CHANGE UP (ref 27–34)
MCHC RBC-ENTMCNC: 32.9 % — SIGNIFICANT CHANGE UP (ref 32–36)
MCHC RBC-ENTMCNC: 33.1 % — SIGNIFICANT CHANGE UP (ref 32–36)
MCV RBC AUTO: 89.4 FL — SIGNIFICANT CHANGE UP (ref 80–100)
MCV RBC AUTO: 90.7 FL — SIGNIFICANT CHANGE UP (ref 80–100)
NRBC # FLD: 0 — SIGNIFICANT CHANGE UP
NRBC # FLD: 0 — SIGNIFICANT CHANGE UP
PCO2 BLDV: 54 MMHG — HIGH (ref 41–51)
PH BLDV: 7.32 PH — SIGNIFICANT CHANGE UP (ref 7.32–7.43)
PHOSPHATE SERPL-MCNC: 3.4 MG/DL — SIGNIFICANT CHANGE UP (ref 2.5–4.5)
PHOSPHATE SERPL-MCNC: 3.5 MG/DL — SIGNIFICANT CHANGE UP (ref 2.5–4.5)
PLATELET # BLD AUTO: 203 K/UL — SIGNIFICANT CHANGE UP (ref 150–400)
PLATELET # BLD AUTO: 225 K/UL — SIGNIFICANT CHANGE UP (ref 150–400)
PMV BLD: 11.3 FL — SIGNIFICANT CHANGE UP (ref 7–13)
PMV BLD: 11.5 FL — SIGNIFICANT CHANGE UP (ref 7–13)
PO2 BLDV: 33 MMHG — LOW (ref 35–40)
POTASSIUM BLDV-SCNC: 3.4 MMOL/L — SIGNIFICANT CHANGE UP (ref 3.4–4.5)
POTASSIUM SERPL-MCNC: 3.5 MMOL/L — SIGNIFICANT CHANGE UP (ref 3.5–5.3)
POTASSIUM SERPL-MCNC: 3.9 MMOL/L — SIGNIFICANT CHANGE UP (ref 3.5–5.3)
POTASSIUM SERPL-SCNC: 3.5 MMOL/L — SIGNIFICANT CHANGE UP (ref 3.5–5.3)
POTASSIUM SERPL-SCNC: 3.9 MMOL/L — SIGNIFICANT CHANGE UP (ref 3.5–5.3)
PROT SERPL-MCNC: 5.8 G/DL — LOW (ref 6–8.3)
PROT SERPL-MCNC: 6.5 G/DL — SIGNIFICANT CHANGE UP (ref 6–8.3)
RBC # BLD: 3.01 M/UL — LOW (ref 4.2–5.8)
RBC # BLD: 3.35 M/UL — LOW (ref 4.2–5.8)
RBC # FLD: 15.3 % — HIGH (ref 10.3–14.5)
RBC # FLD: 15.8 % — HIGH (ref 10.3–14.5)
SAO2 % BLDV: 53.7 % — LOW (ref 60–85)
SODIUM SERPL-SCNC: 140 MMOL/L — SIGNIFICANT CHANGE UP (ref 135–145)
SODIUM SERPL-SCNC: 142 MMOL/L — SIGNIFICANT CHANGE UP (ref 135–145)
TROPONIN T SERPL-MCNC: < 0.06 NG/ML — SIGNIFICANT CHANGE UP (ref 0–0.06)
WBC # BLD: 10.74 K/UL — HIGH (ref 3.8–10.5)
WBC # BLD: 8.93 K/UL — SIGNIFICANT CHANGE UP (ref 3.8–10.5)
WBC # FLD AUTO: 10.74 K/UL — HIGH (ref 3.8–10.5)
WBC # FLD AUTO: 8.93 K/UL — SIGNIFICANT CHANGE UP (ref 3.8–10.5)

## 2018-05-08 PROCEDURE — 43235 EGD DIAGNOSTIC BRUSH WASH: CPT | Mod: GC

## 2018-05-08 PROCEDURE — 99233 SBSQ HOSP IP/OBS HIGH 50: CPT | Mod: GC

## 2018-05-08 RX ORDER — SODIUM CHLORIDE 9 MG/ML
1000 INJECTION INTRAMUSCULAR; INTRAVENOUS; SUBCUTANEOUS
Qty: 0 | Refills: 0 | Status: DISCONTINUED | OUTPATIENT
Start: 2018-05-08 | End: 2018-05-10

## 2018-05-08 RX ORDER — HYDROMORPHONE HYDROCHLORIDE 2 MG/ML
0.5 INJECTION INTRAMUSCULAR; INTRAVENOUS; SUBCUTANEOUS EVERY 6 HOURS
Qty: 0 | Refills: 0 | Status: DISCONTINUED | OUTPATIENT
Start: 2018-05-08 | End: 2018-05-10

## 2018-05-08 RX ORDER — METFORMIN HYDROCHLORIDE 850 MG/1
1 TABLET ORAL
Qty: 0 | Refills: 0 | COMMUNITY

## 2018-05-08 RX ORDER — INFLUENZA VIRUS VACCINE 15; 15; 15; 15 UG/.5ML; UG/.5ML; UG/.5ML; UG/.5ML
0.5 SUSPENSION INTRAMUSCULAR ONCE
Qty: 0 | Refills: 0 | Status: DISCONTINUED | OUTPATIENT
Start: 2018-05-08 | End: 2018-05-10

## 2018-05-08 RX ADMIN — PANTOPRAZOLE SODIUM 10 MG/HR: 20 TABLET, DELAYED RELEASE ORAL at 08:32

## 2018-05-08 RX ADMIN — HYDROMORPHONE HYDROCHLORIDE 0.5 MILLIGRAM(S): 2 INJECTION INTRAMUSCULAR; INTRAVENOUS; SUBCUTANEOUS at 09:51

## 2018-05-08 RX ADMIN — LATANOPROST 1 DROP(S): 0.05 SOLUTION/ DROPS OPHTHALMIC; TOPICAL at 21:06

## 2018-05-08 RX ADMIN — Medication 1 DROP(S): at 05:51

## 2018-05-08 RX ADMIN — SODIUM CHLORIDE 100 MILLILITER(S): 9 INJECTION INTRAMUSCULAR; INTRAVENOUS; SUBCUTANEOUS at 07:50

## 2018-05-08 RX ADMIN — Medication 1 DROP(S): at 17:28

## 2018-05-08 RX ADMIN — MIRTAZAPINE 15 MILLIGRAM(S): 45 TABLET, ORALLY DISINTEGRATING ORAL at 21:35

## 2018-05-08 RX ADMIN — HYDROMORPHONE HYDROCHLORIDE 0.5 MILLIGRAM(S): 2 INJECTION INTRAMUSCULAR; INTRAVENOUS; SUBCUTANEOUS at 10:15

## 2018-05-08 NOTE — PROGRESS NOTE ADULT - PROBLEM SELECTOR PLAN 6
-Hgba1c in AM  -DM diet  -ISS -Hgba1c is 5.3%  -DM diet  -ISS -Hgba1c is 5.3%  -Will d/c DM precautions.

## 2018-05-08 NOTE — PROGRESS NOTE ADULT - PROBLEM SELECTOR PLAN 2
-Likely 2/2 GIB  -hemolysis panel negative.  -Cbc q6h -Likely 2/2 GIB.   -hemolysis panel negative.  -CBC stable x 3.
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
## 2018-05-08 NOTE — PROGRESS NOTE ADULT - PROBLEM SELECTOR PLAN 3
-Heme/onc consulted. Pt currently on palliative chemo  -Oxycodone 5mg q6h for pain -Heme/onc consulted. Pt currently on palliative chemo  -Dilaudid 0.5mg IV q6h for moderate pain.

## 2018-05-08 NOTE — CHART NOTE - NSCHARTNOTEFT_GEN_A_CORE
EGD performed today showing metal stent extending out of the ampulla (approximately ~3-4 cm) with adjacent ulceration and bleeding. This is not amenable to endoscopic control of bleeding. F/u CT A/P. Tentative plan for EGD/ERCP tomorrow with Dr King. Keep npo after midnight.

## 2018-05-09 LAB
ALBUMIN SERPL ELPH-MCNC: 2.5 G/DL — LOW (ref 3.3–5)
ALP SERPL-CCNC: 84 U/L — SIGNIFICANT CHANGE UP (ref 40–120)
ALT FLD-CCNC: 14 U/L — SIGNIFICANT CHANGE UP (ref 4–41)
AST SERPL-CCNC: 17 U/L — SIGNIFICANT CHANGE UP (ref 4–40)
BASOPHILS # BLD AUTO: 0.02 K/UL — SIGNIFICANT CHANGE UP (ref 0–0.2)
BASOPHILS NFR BLD AUTO: 0.2 % — SIGNIFICANT CHANGE UP (ref 0–2)
BILIRUB SERPL-MCNC: 1 MG/DL — SIGNIFICANT CHANGE UP (ref 0.2–1.2)
BUN SERPL-MCNC: 12 MG/DL — SIGNIFICANT CHANGE UP (ref 7–23)
CALCIUM SERPL-MCNC: 8.2 MG/DL — LOW (ref 8.4–10.5)
CHLORIDE SERPL-SCNC: 106 MMOL/L — SIGNIFICANT CHANGE UP (ref 98–107)
CO2 SERPL-SCNC: 24 MMOL/L — SIGNIFICANT CHANGE UP (ref 22–31)
CREAT SERPL-MCNC: 0.98 MG/DL — SIGNIFICANT CHANGE UP (ref 0.5–1.3)
EOSINOPHIL # BLD AUTO: 0.27 K/UL — SIGNIFICANT CHANGE UP (ref 0–0.5)
EOSINOPHIL NFR BLD AUTO: 3.1 % — SIGNIFICANT CHANGE UP (ref 0–6)
GLUCOSE BLDC GLUCOMTR-MCNC: 125 MG/DL — HIGH (ref 70–99)
GLUCOSE BLDC GLUCOMTR-MCNC: 88 MG/DL — SIGNIFICANT CHANGE UP (ref 70–99)
GLUCOSE SERPL-MCNC: 77 MG/DL — SIGNIFICANT CHANGE UP (ref 70–99)
HCT VFR BLD CALC: 24.7 % — LOW (ref 39–50)
HGB BLD-MCNC: 8.1 G/DL — LOW (ref 13–17)
IMM GRANULOCYTES # BLD AUTO: 0.03 # — SIGNIFICANT CHANGE UP
IMM GRANULOCYTES NFR BLD AUTO: 0.3 % — SIGNIFICANT CHANGE UP (ref 0–1.5)
LYMPHOCYTES # BLD AUTO: 1.9 K/UL — SIGNIFICANT CHANGE UP (ref 1–3.3)
LYMPHOCYTES # BLD AUTO: 21.8 % — SIGNIFICANT CHANGE UP (ref 13–44)
MAGNESIUM SERPL-MCNC: 1.9 MG/DL — SIGNIFICANT CHANGE UP (ref 1.6–2.6)
MCHC RBC-ENTMCNC: 29.7 PG — SIGNIFICANT CHANGE UP (ref 27–34)
MCHC RBC-ENTMCNC: 32.8 % — SIGNIFICANT CHANGE UP (ref 32–36)
MCV RBC AUTO: 90.5 FL — SIGNIFICANT CHANGE UP (ref 80–100)
MONOCYTES # BLD AUTO: 1.23 K/UL — HIGH (ref 0–0.9)
MONOCYTES NFR BLD AUTO: 14.1 % — HIGH (ref 2–14)
NEUTROPHILS # BLD AUTO: 5.27 K/UL — SIGNIFICANT CHANGE UP (ref 1.8–7.4)
NEUTROPHILS NFR BLD AUTO: 60.5 % — SIGNIFICANT CHANGE UP (ref 43–77)
NRBC # FLD: 0 — SIGNIFICANT CHANGE UP
PHOSPHATE SERPL-MCNC: 3.6 MG/DL — SIGNIFICANT CHANGE UP (ref 2.5–4.5)
PLATELET # BLD AUTO: 227 K/UL — SIGNIFICANT CHANGE UP (ref 150–400)
PMV BLD: 11.5 FL — SIGNIFICANT CHANGE UP (ref 7–13)
POTASSIUM SERPL-MCNC: 3.3 MMOL/L — LOW (ref 3.5–5.3)
POTASSIUM SERPL-SCNC: 3.3 MMOL/L — LOW (ref 3.5–5.3)
PROT SERPL-MCNC: 5.2 G/DL — LOW (ref 6–8.3)
RBC # BLD: 2.73 M/UL — LOW (ref 4.2–5.8)
RBC # FLD: 15.8 % — HIGH (ref 10.3–14.5)
SODIUM SERPL-SCNC: 139 MMOL/L — SIGNIFICANT CHANGE UP (ref 135–145)
WBC # BLD: 8.72 K/UL — SIGNIFICANT CHANGE UP (ref 3.8–10.5)
WBC # FLD AUTO: 8.72 K/UL — SIGNIFICANT CHANGE UP (ref 3.8–10.5)

## 2018-05-09 PROCEDURE — 99233 SBSQ HOSP IP/OBS HIGH 50: CPT | Mod: GC

## 2018-05-09 PROCEDURE — 99223 1ST HOSP IP/OBS HIGH 75: CPT | Mod: 25,GC

## 2018-05-09 PROCEDURE — 77263 THER RADIOLOGY TX PLNG CPLX: CPT

## 2018-05-09 PROCEDURE — 77295 3-D RADIOTHERAPY PLAN: CPT | Mod: 26

## 2018-05-09 PROCEDURE — 77334 RADIATION TREATMENT AID(S): CPT | Mod: 26

## 2018-05-09 PROCEDURE — 43239 EGD BIOPSY SINGLE/MULTIPLE: CPT | Mod: GC

## 2018-05-09 PROCEDURE — 77300 RADIATION THERAPY DOSE PLAN: CPT | Mod: 26

## 2018-05-09 PROCEDURE — 99222 1ST HOSP IP/OBS MODERATE 55: CPT | Mod: 25

## 2018-05-09 RX ORDER — PANTOPRAZOLE SODIUM 20 MG/1
40 TABLET, DELAYED RELEASE ORAL EVERY 12 HOURS
Qty: 0 | Refills: 0 | Status: DISCONTINUED | OUTPATIENT
Start: 2018-05-09 | End: 2018-05-10

## 2018-05-09 RX ORDER — PANTOPRAZOLE SODIUM 20 MG/1
40 TABLET, DELAYED RELEASE ORAL EVERY 12 HOURS
Qty: 0 | Refills: 0 | Status: DISCONTINUED | OUTPATIENT
Start: 2018-05-09 | End: 2018-05-09

## 2018-05-09 RX ORDER — PANTOPRAZOLE SODIUM 20 MG/1
40 TABLET, DELAYED RELEASE ORAL
Qty: 0 | Refills: 0 | Status: DISCONTINUED | OUTPATIENT
Start: 2018-05-09 | End: 2018-05-09

## 2018-05-09 RX ORDER — POTASSIUM CHLORIDE 20 MEQ
20 PACKET (EA) ORAL ONCE
Qty: 0 | Refills: 0 | Status: COMPLETED | OUTPATIENT
Start: 2018-05-09 | End: 2018-05-09

## 2018-05-09 RX ADMIN — HYDROMORPHONE HYDROCHLORIDE 0.5 MILLIGRAM(S): 2 INJECTION INTRAMUSCULAR; INTRAVENOUS; SUBCUTANEOUS at 08:31

## 2018-05-09 RX ADMIN — Medication 20 MILLIEQUIVALENT(S): at 15:10

## 2018-05-09 RX ADMIN — LATANOPROST 1 DROP(S): 0.05 SOLUTION/ DROPS OPHTHALMIC; TOPICAL at 21:04

## 2018-05-09 RX ADMIN — Medication 1 DROP(S): at 17:36

## 2018-05-09 RX ADMIN — MIRTAZAPINE 15 MILLIGRAM(S): 45 TABLET, ORALLY DISINTEGRATING ORAL at 21:04

## 2018-05-09 RX ADMIN — PANTOPRAZOLE SODIUM 40 MILLIGRAM(S): 20 TABLET, DELAYED RELEASE ORAL at 17:36
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
## 2018-05-09 NOTE — PROGRESS NOTE ADULT - PROBLEM SELECTOR PLAN 3
Outpt f/u to resume chemo on d/c.    D/W team    Please page at [phone] with questions or concerns.

## 2018-05-09 NOTE — PROGRESS NOTE ADULT - SUBJECTIVE AND OBJECTIVE BOX
GI Followup Note:   Pt seen and examined at bedside.   72y year old  Male seen  by GI  for hematochezia and symptomatic acute hgb drop anemia.    Subjective:      REVIEW OF SYSTEMS:  Constitutional: No fever, weight loss or fatigue  Cardiovascular: No chest pain, palpitations, dizziness or leg swelling  Gastrointestinal: No abdominal or epigastric pain. No nausea, vomiting or hematemesis; No diarrhea or constipation. No melena or hematochezia.  Skin: No itching, burning, rashes or lesions     Allergies: No Known Allergies      Medications:   HYDROmorphone  Injectable 0.5 milliGRAM(s) IV Push every 6 hours PRN  influenza   Vaccine 0.5 milliLiter(s) IntraMuscular once  latanoprost 0.005% Ophthalmic Solution 1 Drop(s) Right EYE at bedtime  mirtazapine 15 milliGRAM(s) Oral at bedtime  sodium chloride 0.9%. 1000 milliLiter(s) IV Continuous <Continuous>  timolol 0.5% Solution 1 Drop(s) Right EYE two times a day      PHYSICAL EXAM:    Vital Signs Last 24 Hrs  T(C): 36.6 (09 May 2018 06:01), Max: 36.7 (08 May 2018 14:48)  T(F): 97.8 (09 May 2018 06:01), Max: 98.1 (08 May 2018 14:48)  HR: 67 (09 May 2018 06:01) (59 - 73)  BP: 121/55 (09 May 2018 06:01) (119/50 - 135/74)  BP(mean): --  RR: 16 (09 May 2018 06:01) (15 - 16)  SpO2: 100% (09 May 2018 06:01) (100% - 100%)      General: Well developed; well nourished; in no acute distress  HEENT: PERRLA  Lungs: GBAE  Gastrointestinal: Soft non-tender non-distended; Normal bowel sounds; No hepatosplenomegaly. No rebound or guarding  Skin: Warm and dry. No obvious rash    LABS:             10.0   10.74 )-----------( 225      ( 08 May 2018 07:50 )             30.4     MCV 90.7 (05-08-18)  RDW 15.8 (05-08-18)    hgb trend:  10.0  05-08-18 @ 07:50  8.9  05-08-18 @ 02:30  7.6  05-07-18 @ 19:50  6.7  05-07-18 @ 14:26  7.2  05-07-18 @ 09:55    WBC trend  10.74  05-08-18 @ 07:50  8.93  05-08-18 @ 02:30  8.63  05-07-18 @ 19:50  9.83  05-07-18 @ 14:26  12.11  05-07-18 @ 09:55    05-08    140  |  104  |  15  ----------------------------<  77  3.5   |  21<L>  |  0.91    Ca    9.3      08 May 2018 07:50  Phos  3.5     05-08  Mg     2.1     05-08    TPro  6.5  /  Alb  3.3  /  TBili  0.9  /  DBili  x   /  AST  20  /  ALT  15  /  AlkPhos  109  05-08    Liver panel trend:  TBili 0.9   /   AST 20   /   ALT 15   /   AlkP 109   /   Tptn 6.5   /   Alb 3.3    /   DBili --      05-08  TBili 0.7   /   AST 16   /   ALT 14   /   AlkP 95   /   Tptn 5.8   /   Alb 2.9    /   DBili --      05-08  TBili 0.5   /   AST 17   /   ALT 12   /   AlkP 101   /   Tptn 6.3   /   Alb 3.5    /   DBili 0.2      05-07  TBili 0.6   /   AST 19   /   ALT 13   /   AlkP 108   /   Tptn 6.8   /   Alb 3.7    /   DBili --      05-07    PT/INR - ( 07 May 2018 09:55 )   PT: 13.0 SEC;   INR: 1.13      PTT - ( 07 May 2018 09:55 )  PTT:29.0 SEC      CTA:  < from: CT Angio Abdomen and Pelvis w/ IV Cont (05.07.18 @ 22:29) >  LIVER: Diffuse liver metastatic disease again seen.  BILE DUCTS: CBD stent and pneumobilia again seen.  GALLBLADDER: Small amount of intraluminal air again seen.   SPLEEN: Within normal limits.  PANCREAS: Pancreatic stent. Pancreatic head mass, again seen. Main   pancreatic duct dilatation and cystic lesion in the pancreatic body are   again seen.     IMPRESSION:     No active GI contrast extravasation.    Pancreatic head mass with liver metastases. Suspicious for pancreatic   mass invading duodenum.    Gastric and rectal varices.    < end of copied text >

## 2018-05-09 NOTE — CHART NOTE - NSCHARTNOTEFT_GEN_A_CORE
PROCEDURE NOTE:    EGD:  - normal esophagus and stomach  - metal stent at the major papilla noted to be extending upto 3-4 cm into the distal duodenum  - edematous, ulcerated mucosa in the 2nd portion of the duodenum with oozing of blood  - unable to advance endoscope beyond the stent    IMPRESSION:  1) Likely bleeding from tumor invasion into the duodenum - not amenable to endoscopic therapies.  2) CBD stent - not removed given unable to advance the ERCP endoscope into the 2nd portion of the duodenum.    PLAN:  - clear liquids  - monitor hemoglobin  - supportive care  - goals of care discussion with patient  - monitor liver enzymes

## 2018-05-09 NOTE — PROGRESS NOTE ADULT - PROBLEM SELECTOR PLAN 1
- CTA significant for friable tumor bleeding and EGD demonstrating metal biliary stent protruding into the duodenum.  -For ERCP today  -s/p 2 units pRBCs total so far  -IVF  -No DVT prophylaxis in the setting of GIB - CTA significant for friable tumor bleeding and EGD demonstrating metal biliary stent protruding into the duodenum.  -For ERCP today  -s/p 2 units pRBCs total so far  -IVF  -IV Protonix BID  -No DVT prophylaxis in the setting of GIB

## 2018-05-09 NOTE — PROGRESS NOTE ADULT - SUBJECTIVE AND OBJECTIVE BOX
INTERVAL HPI/OVERNIGHT EVENTS:  Patient S&E at bedside. No o/n events, patient resting comfortably. No complaints at this time. Patient denies fever, chills, dizziness, weakness, CP, palpitations, SOB, cough, N/V/D/C, dysuria, changes in bowel movements, LE edema.    VITAL SIGNS:  T(F): 97.8 (05-09-18 @ 06:18)  HR: 67 (05-09-18 @ 06:18)  BP: 121/55 (05-09-18 @ 06:18)  RR: 16 (05-09-18 @ 06:01)  SpO2: 100% (05-09-18 @ 06:18)  Wt(kg): --    PHYSICAL EXAM:    Constitutional: WDWN, NAD,   Eyes: PERRL, EOMI, sclera non-icteric  Neck: supple, no masses, no JVD  Respiratory: CTA b/l, good air entry b/l, no wheezing, rhonchi, rales, with normal respiratory effort and no intercostal retractions  Cardiovascular: RRR, normal S1S2, no M/R/G  Gastrointestinal: soft, NTND, no masses palpable, BS normal in all four quadrants, no HSM  Extremities: WWP, no c/c/e  Neurological: AAOx3  Skin: Normal temperature    MEDICATIONS  (STANDING):  influenza   Vaccine 0.5 milliLiter(s) IntraMuscular once  latanoprost 0.005% Ophthalmic Solution 1 Drop(s) Right EYE at bedtime  mirtazapine 15 milliGRAM(s) Oral at bedtime  pantoprazole  Injectable 40 milliGRAM(s) IV Push every 12 hours  potassium chloride    Tablet ER 20 milliEquivalent(s) Oral once  sodium chloride 0.9%. 1000 milliLiter(s) (100 mL/Hr) IV Continuous <Continuous>  timolol 0.5% Solution 1 Drop(s) Right EYE two times a day    MEDICATIONS  (PRN):  HYDROmorphone  Injectable 0.5 milliGRAM(s) IV Push every 6 hours PRN Moderate Pain (4 - 6)      Allergies    No Known Allergies    Intolerances        LABS:                        8.1    8.72  )-----------( 227      ( 09 May 2018 06:50 )             24.7     05-09    139  |  106  |  12  ----------------------------<  77  3.3<L>   |  24  |  0.98    Ca    8.2<L>      09 May 2018 06:50  Phos  3.6     05-09  Mg     1.9     05-09    TPro  5.2<L>  /  Alb  2.5<L>  /  TBili  1.0  /  DBili  x   /  AST  17  /  ALT  14  /  AlkPhos  84  05-09    PT/INR - ( 07 May 2018 09:55 )   PT: 13.0 SEC;   INR: 1.13          PTT - ( 07 May 2018 09:55 )  PTT:29.0 SEC      RADIOLOGY & ADDITIONAL TESTS:  Studies reviewed.

## 2018-05-09 NOTE — PROGRESS NOTE ADULT - PROBLEM SELECTOR PLAN 1
Secondary to invasion of tumor in the duodenum. Appreciate GI recc, ERCP- not amenable to cauterizing the oozing vessel.  Continues to have GI bleed. Will call rad/onc for the same.

## 2018-05-09 NOTE — PROGRESS NOTE ADULT - PROBLEM SELECTOR PLAN 9
-Pt does not have insight into his prognosis, which is exceedingly grim.   -Ongoing goals of care discussion with pt and daughter.

## 2018-05-10 VITALS
DIASTOLIC BLOOD PRESSURE: 66 MMHG | HEART RATE: 66 BPM | SYSTOLIC BLOOD PRESSURE: 135 MMHG | RESPIRATION RATE: 18 BRPM | OXYGEN SATURATION: 100 % | TEMPERATURE: 98 F

## 2018-05-10 DIAGNOSIS — G89.3 NEOPLASM RELATED PAIN (ACUTE) (CHRONIC): ICD-10-CM

## 2018-05-10 DIAGNOSIS — R53.81 OTHER MALAISE: ICD-10-CM

## 2018-05-10 DIAGNOSIS — Z51.5 ENCOUNTER FOR PALLIATIVE CARE: ICD-10-CM

## 2018-05-10 LAB
ALBUMIN SERPL ELPH-MCNC: 3.1 G/DL — LOW (ref 3.3–5)
ALP SERPL-CCNC: 99 U/L — SIGNIFICANT CHANGE UP (ref 40–120)
ALT FLD-CCNC: 15 U/L — SIGNIFICANT CHANGE UP (ref 4–41)
AST SERPL-CCNC: 21 U/L — SIGNIFICANT CHANGE UP (ref 4–40)
BILIRUB SERPL-MCNC: 1.2 MG/DL — SIGNIFICANT CHANGE UP (ref 0.2–1.2)
BUN SERPL-MCNC: 8 MG/DL — SIGNIFICANT CHANGE UP (ref 7–23)
CALCIUM SERPL-MCNC: 8.7 MG/DL — SIGNIFICANT CHANGE UP (ref 8.4–10.5)
CHLORIDE SERPL-SCNC: 107 MMOL/L — SIGNIFICANT CHANGE UP (ref 98–107)
CO2 SERPL-SCNC: 25 MMOL/L — SIGNIFICANT CHANGE UP (ref 22–31)
CREAT SERPL-MCNC: 0.89 MG/DL — SIGNIFICANT CHANGE UP (ref 0.5–1.3)
GLUCOSE SERPL-MCNC: 86 MG/DL — SIGNIFICANT CHANGE UP (ref 70–99)
HCT VFR BLD CALC: 30.2 % — LOW (ref 39–50)
HGB BLD-MCNC: 9.8 G/DL — LOW (ref 13–17)
MAGNESIUM SERPL-MCNC: 1.9 MG/DL — SIGNIFICANT CHANGE UP (ref 1.6–2.6)
MCHC RBC-ENTMCNC: 29.7 PG — SIGNIFICANT CHANGE UP (ref 27–34)
MCHC RBC-ENTMCNC: 32.5 % — SIGNIFICANT CHANGE UP (ref 32–36)
MCV RBC AUTO: 91.5 FL — SIGNIFICANT CHANGE UP (ref 80–100)
NRBC # FLD: 0 — SIGNIFICANT CHANGE UP
PHOSPHATE SERPL-MCNC: 3.3 MG/DL — SIGNIFICANT CHANGE UP (ref 2.5–4.5)
PLATELET # BLD AUTO: 315 K/UL — SIGNIFICANT CHANGE UP (ref 150–400)
PMV BLD: 11.3 FL — SIGNIFICANT CHANGE UP (ref 7–13)
POTASSIUM SERPL-MCNC: 3.5 MMOL/L — SIGNIFICANT CHANGE UP (ref 3.5–5.3)
POTASSIUM SERPL-SCNC: 3.5 MMOL/L — SIGNIFICANT CHANGE UP (ref 3.5–5.3)
PROT SERPL-MCNC: 5.9 G/DL — LOW (ref 6–8.3)
RBC # BLD: 3.3 M/UL — LOW (ref 4.2–5.8)
RBC # FLD: 15.9 % — HIGH (ref 10.3–14.5)
SODIUM SERPL-SCNC: 140 MMOL/L — SIGNIFICANT CHANGE UP (ref 135–145)
WBC # BLD: 8.65 K/UL — SIGNIFICANT CHANGE UP (ref 3.8–10.5)
WBC # FLD AUTO: 8.65 K/UL — SIGNIFICANT CHANGE UP (ref 3.8–10.5)

## 2018-05-10 PROCEDURE — 77280 THER RAD SIMULAJ FIELD SMPL: CPT | Mod: 26

## 2018-05-10 PROCEDURE — 99223 1ST HOSP IP/OBS HIGH 75: CPT

## 2018-05-10 PROCEDURE — 99239 HOSP IP/OBS DSCHRG MGMT >30: CPT

## 2018-05-10 RX ORDER — MORPHINE SULFATE 50 MG/1
2 CAPSULE, EXTENDED RELEASE ORAL EVERY 6 HOURS
Qty: 0 | Refills: 0 | Status: DISCONTINUED | OUTPATIENT
Start: 2018-05-10 | End: 2018-05-10

## 2018-05-10 RX ORDER — OXYCODONE HYDROCHLORIDE 5 MG/1
5 TABLET ORAL EVERY 4 HOURS
Qty: 0 | Refills: 0 | Status: DISCONTINUED | OUTPATIENT
Start: 2018-05-10 | End: 2018-05-10

## 2018-05-10 RX ORDER — PANTOPRAZOLE SODIUM 20 MG/1
1 TABLET, DELAYED RELEASE ORAL
Qty: 30 | Refills: 0 | OUTPATIENT
Start: 2018-05-10 | End: 2018-06-08

## 2018-05-10 RX ORDER — SENNA PLUS 8.6 MG/1
2 TABLET ORAL AT BEDTIME
Qty: 0 | Refills: 0 | Status: DISCONTINUED | OUTPATIENT
Start: 2018-05-10 | End: 2018-05-10

## 2018-05-10 RX ORDER — DOCUSATE SODIUM 100 MG
100 CAPSULE ORAL DAILY
Qty: 0 | Refills: 0 | Status: DISCONTINUED | OUTPATIENT
Start: 2018-05-10 | End: 2018-05-10

## 2018-05-10 RX ORDER — SENNA PLUS 8.6 MG/1
1 TABLET ORAL
Qty: 30 | Refills: 0 | OUTPATIENT
Start: 2018-05-10 | End: 2018-06-08

## 2018-05-10 RX ORDER — SENNA PLUS 8.6 MG/1
1 TABLET ORAL AT BEDTIME
Qty: 0 | Refills: 0 | Status: DISCONTINUED | OUTPATIENT
Start: 2018-05-10 | End: 2018-05-10

## 2018-05-10 RX ORDER — DOCUSATE SODIUM 100 MG
1 CAPSULE ORAL
Qty: 30 | Refills: 0 | OUTPATIENT
Start: 2018-05-10 | End: 2018-06-08

## 2018-05-10 RX ORDER — OXYCODONE HYDROCHLORIDE 5 MG/1
1 TABLET ORAL
Qty: 90 | Refills: 0 | OUTPATIENT
Start: 2018-05-10 | End: 2018-05-24

## 2018-05-10 RX ORDER — OXYCODONE AND ACETAMINOPHEN 5; 325 MG/1; MG/1
1 TABLET ORAL EVERY 6 HOURS
Qty: 0 | Refills: 0 | Status: DISCONTINUED | OUTPATIENT
Start: 2018-05-10 | End: 2018-05-10

## 2018-05-10 RX ADMIN — PANTOPRAZOLE SODIUM 40 MILLIGRAM(S): 20 TABLET, DELAYED RELEASE ORAL at 05:35

## 2018-05-10 RX ADMIN — MORPHINE SULFATE 2 MILLIGRAM(S): 50 CAPSULE, EXTENDED RELEASE ORAL at 10:42

## 2018-05-10 RX ADMIN — PANTOPRAZOLE SODIUM 40 MILLIGRAM(S): 20 TABLET, DELAYED RELEASE ORAL at 18:44

## 2018-05-10 RX ADMIN — Medication 1 DROP(S): at 05:34

## 2018-05-10 RX ADMIN — OXYCODONE HYDROCHLORIDE 5 MILLIGRAM(S): 5 TABLET ORAL at 18:46

## 2018-05-10 RX ADMIN — OXYCODONE HYDROCHLORIDE 5 MILLIGRAM(S): 5 TABLET ORAL at 19:46

## 2018-05-10 RX ADMIN — Medication 1 DROP(S): at 18:43

## 2018-05-10 RX ADMIN — MORPHINE SULFATE 2 MILLIGRAM(S): 50 CAPSULE, EXTENDED RELEASE ORAL at 10:58

## 2018-05-10 NOTE — PROGRESS NOTE ADULT - ATTENDING COMMENTS
Rad-onc input appreciated.  no further evidence of bleeding, counts and hemodynamics remain stable.    imp- GIB from pancreatic ca oozing at site of stent placement.  plan- RT- we can finish this as outpatient - will confer wioth RT in that regard.  will speak with his daughter (with his approval) to review [facility]
GI input appreciated, case d/w with them.    no further intervention felt safe/possible with the biliary stent.  he will likely continue to ooze from the friable tumor.    counts dropped- hemodynamics stable.  will recheck- set up family meeting to discuss GOC
no sx's of anemia.  counts and hemodynamics are stabel.    imp- UGIB possibly from pancreatic ca invading duodenum.  plan- for EGD now

## 2018-05-10 NOTE — PROGRESS NOTE ADULT - PROBLEM SELECTOR PLAN 9
-Pt does not have insight into his prognosis, which is exceedingly grim.   -Ongoing goals of care discussion with pt and daughter.  -Palliative care consulted.

## 2018-05-10 NOTE — CONSULT NOTE ADULT - PROBLEM SELECTOR RECOMMENDATION 3
-Will resume chemo as outpatient on d/c.    Please page at [phone] with questions or concerns.
Assist with ADL's as needed.  Fall precautions.

## 2018-05-10 NOTE — CONSULT NOTE ADULT - PROBLEM SELECTOR RECOMMENDATION 2
-Secondary to GI bleed.   -Transfusion as needed.
As above.  Patient reports taking oxycodone 5mg PO PRN on average twice a day with relief.  Pain has been stable, has not worsened and he reports relief with home regimen.  Oxycodone 5mg PO q4h PRN restarted.  IV morphine and percocet discontinued.  Bowel Regimen due to chronic constipation issues.  Patient verbalized understanding of the need to adhere to a regimen.

## 2018-05-10 NOTE — PROGRESS NOTE ADULT - ASSESSMENT
73 yo man admitted for UGIB secondary to ulcerations in the duodenum (tumor invasion vs cbd stent trauma) with no eviodence of active gib at the moment. cta on 5/7/18 shows ? gastric varices not appreciated on EGD, rectal varices and a cbd which appears to be in place and draining.    Rec:  - ppi therapy  - h/h trending and transfusions if needed.  - Liver panel trending ( with possible replacement of the stent in the occurrence of jaundice)
72 year old M, PMH DM, HTN, glaucoma, prostate ca and metastatic pancreatic CA presents with BRBPR concerning for UGIB and possible erosion into the duodenum, currently hemodynamically stable s/p 2 units pRBCs found to have bleeding from friable tumor, for ERCP today.
72 year old M, PMH DM, HTN, glaucoma, prostate ca and metastatic pancreatic CA presents with BRBPR concerning for UGIB and possible erosion into the duodenum, currently hemodynamically stable s/p 2 units pRBCs found to have bleeding from friable tumor, starting palliative radiation.
72 year old M, PMH DM, HTN, glaucoma, prostate ca and metastatic pancreatic CA presents with BRBPR concerning for UGIB and possible erosion into the duodenum, currently hemodynamically stable s/p 2 units pRBCs overnight.
Mr. Kirkpatrick, is a 71 y/o M, with h/o metastatic pancreatic adenocarcinoma on palliative chemotherapy with gemcitabine and Abraxane, Q 2 weeks,  C2 on 4/23, admitted with symptomatic anemia and BRBPR.

## 2018-05-10 NOTE — CONSULT NOTE ADULT - PROBLEM SELECTOR RECOMMENDATION 4
Plan for dc home with completion of RT as an outpatient and follow-up with Dr. Wagner.  Psychosocial support provided.

## 2018-05-10 NOTE — CONSULT NOTE ADULT - CONSULT REASON
Anemia h/o pancreatic cancer
bleeding duodenal mass, pancreatic ca
pain management
bright red blood per rectum

## 2018-05-10 NOTE — PROGRESS NOTE ADULT - PROBLEM SELECTOR PLAN 1
- CTA significant for friable tumor bleeding and EGD demonstrating metal biliary stent protruding into the duodenum. S/p ERCP without possible intervention. Now s/p mapping out for palliative radiation.   -s/p 2 units pRBCs total so far; H/H stable.  -Regular diet.  -IV Protonix BID  -No DVT prophylaxis in the setting of GIB

## 2018-05-10 NOTE — CONSULT NOTE ADULT - PROBLEM SELECTOR RECOMMENDATION 9
Patient of Lubna Wagner.  Plan for follow-up with Dr. Wagner upon discharge. Continue supportive care.
-Upper vs lower GI bleed. GI consult.  -Transfuse PRBC to keep Hb > 8 gm/dl.   -PPI.
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
## 2018-05-10 NOTE — CONSULT NOTE ADULT - ATTENDING COMMENTS
History/PE as above. Patient presents with acute GI bleed manifesting as hematochezia with syncopal episode. Reports single episode of loose stool described as black with red blood at 8 AM. Experienced lower abdominal discomfort. No recurrent episodes of hematochezia or melena. Denies hematemesis. History of advanced metastatic pancreatic adenocarcinoma noted with imaging report suggesting invasion of pancreatic cancer into duodenal bulb. Previously had required ERCP with stent and PD stent reported in place. Possibility of UGI bleed related to pancreatic cancer either as a consequence of malignant invasion of duodenum versus possible splenic vein thrombosis with resulting gastric varices is considered. Possibility of lower GI bleed also considered related to diverticulosis, angioectasaia or neoplasm. Recommend n.p.o. for now and continue resuscitation with transfusion of PRBCs to hemoglobin approximately 8 g. Continue PPI. Evaluate with CTA and tentatively plan for EGD. An event of increased activity of bleeding will proceed with urgent EGD.
Patient seen and examined.  Agree with NP note.

## 2018-05-10 NOTE — CONSULT NOTE ADULT - ASSESSMENT
72 year old male with metastatic pancreatic cancer found to have bleeding due to tumor invasion of the duodenum - currently receiving RT x 5 sessions.  Palliative consulted for pain management.

## 2018-05-10 NOTE — CONSULT NOTE ADULT - SUBJECTIVE AND OBJECTIVE BOX
HPI:  Obtained from H&P   71 Y/O M admits to PMH of HTN, DM, newly diagnosed metastatic pancreatic adenocarcinoma who initially presented with obstruction s/p ERCP and biliary stent placement on 1/23/18- unresectable secondary to hepatic metastasis currently on palliative chemotherapy with gemcitabine and Abraxane, Q 2 weeks,  C2 on 4/23 , prostate cancer s/p prostatectomy in 2009, presents with  Bright red blood [place] and "dark diarrhea." Patient reports that he initially felt the urge to defecate and had some small dark stool. Then he went to brush his teeth and had another urge with lower abdominal pain that radiated to the back. When he sat on the toilet he said he had a lot of bright red blood as well as dark stool. After that, he got up, felt dizzy and fell to the floor, unwitnessed. Got up by himself after. Does not know if he hit his head. Patient reports nausea last night without vomiting. He has never had bleeding like this, but has noted some streaks of blood on tissue paper in the past. Has not had a colonoscopy. He denies any chest pain, shortness of breath, (07 May 2018 12:25)    Patient found to have bleeding due to tumor invasion of the duodenum - currently receiving RT x 5 sessions     Patient currently laying in bed in no acute distress       PERTINENT PMH REVIEWED:  [x ] YES [ ] NO           SOCIAL HISTORY:  Significant other/partner:  [ ] YES  [ x] NO            Children:  [x ] YES  [ ] NO                   Mandaeism/Spirituality:  Sabianism   Substance hx:  [ ] YES   [x ] NO           Tobacco hx:  [x ] YES - former             Alcohol hx: [x ] YES - former (last december 2017) - reports 3 beers and a glass of al daily prior to December      Home Opioid hx:  [x ] YES - oxycodone PRN    Living Situation: [x ] Home with his daughter     FAMILY HISTORY:  Family history of sickle cell anemia (Sibling)  Diabetes mellitus (Sibling)      BASELINE ADLs (prior to admission):  Independent [ ] moderately [x ] fully   Dependent   [ ] moderately [ ] fully    Code Status:  Full Code                          [x ] Surrogate  [ ] HCP  [ ] Guardian:      Pamella Woods                              Phone#:  [phone]    Allergies    No Known Allergies    Intolerances        MEDICATIONS  (STANDING):  docusate sodium 100 milliGRAM(s) Oral daily  influenza   Vaccine 0.5 milliLiter(s) IntraMuscular once  latanoprost 0.005% Ophthalmic Solution 1 Drop(s) Right EYE at bedtime  mirtazapine 15 milliGRAM(s) Oral at bedtime  pantoprazole  Injectable 40 milliGRAM(s) IV Push every 12 hours  senna 1 Tablet(s) Oral at bedtime  timolol 0.5% Solution 1 Drop(s) Right EYE two times a day    MEDICATIONS  (PRN):  oxyCODONE    IR 5 milliGRAM(s) Oral every 4 hours PRN moderate and severe pain      PRESENT SYMPTOMS:  Source: [x ] Patient   [ ] Family   [ ] Team     Pain:  Denies currently.  Patient reports intermittent lower abdominal pain and right mid to low back pain for ~ 4 mths - unable to qualify type of pain, 9/10 at its worst - after he takes oxycodone the pain goes to 0/10.  Patient states that laying down aggravates it and denies radiation.    Dyspnea: [ ] YES [x ] NO - Mild [ ]  Moderate [ ]  Severe [ ]    Anxiety: [ ] YES [x ] NO  Fatigue: [ ] YES [x ] NO   Nausea: [ ] YES [x ] NO  Loss of appetite: [x ] YES - improving the past few weeks   Constipation: [x ] YES [ ] NO - chronic - last BM 5/7/18     Other Symptoms:  [x ] All other review of systems negative   [ ] Unable to obtain due to poor mentation     Does patient meet criteria for Severe Protein Calorie Malnutrition?  Yes [ ]  No [ ]  PPSV 30% or below [ ]  Anasarca [ ]  Albumin < 2 [ ] Catabolic State [ ] Poor nutritional intake [ ] Significant weight loss [ ]      Palliative Performance Status Version 2:  80%  ECOG - 1     Vital Signs Last 24 Hrs  T(C): 36.8 (10 May 2018 05:33), Max: 36.8 (09 May 2018 18:44)  T(F): 98.2 (10 May 2018 05:33), Max: 98.3 (09 May 2018 18:44)  HR: 72 (10 May 2018 10:38) (58 - 72)  BP: 136/75 (10 May 2018 10:38) (124/61 - 144/68)  BP(mean): --  RR: 18 (10 May 2018 05:33) (17 - 18)  SpO2: 100% (10 May 2018 05:33) (100% - 100%)    Physical Exam:    General: [x ] Alert,  A&O x 4    Lungs: [x ] Clear anteriorly   Cardiovascular:  [x ] Regular rate and rhythm  [ ] Irregular [ ] Tachycardia   [ ] Bradycardia   Abdomen: [x ] Soft  [ ] Distended  [x ] +BS  [x ] Non tender [ ] Tender  [ ]PEG   [ ] NGT   Last BM: 5/7/18    Genitourinary: [x ] Normal [ ] Incontinent   [ ] Oliguria/Anuria   [ ] Sy  Musculoskeletal:  [ ] Normal   [x ] Generalized weakness  [ ] Bedbound  [ ] Edema   Neurological: [x ] No focal deficits  [ ] Cognitive impairment     Skin: [x ] Normal   [ ] Pressure ulcers     LABS:                        9.8    8.65  )-----------( 315      ( 10 May 2018 05:06 )             30.2     05-10    140  |  107  |  8   ----------------------------<  86  3.5   |  25  |  0.89    Ca    8.7      10 May 2018 05:06  Phos  3.3     05-10  Mg     1.9     05-10    TPro  5.9<L>  /  Alb  3.1<L>  /  TBili  1.2  /  DBili  x   /  AST  21  /  ALT  15  /  AlkPhos  99  05-10        I&O's Summary      RADIOLOGY & ADDITIONAL STUDIES: Reviewed   EGD:  - normal esophagus and stomach  - metal stent at the major papilla noted to be extending upto 3-4 cm into the distal duodenum  - edematous, ulcerated mucosa in the 2nd portion of the duodenum with oozing of blood  - unable to advance endoscope beyond the stent    IMPRESSION:  1) Likely bleeding from tumor invasion into the duodenum - not amenable to endoscopic therapies.  2) CBD stent - not removed given unable to advance the ERCP endoscope into the 2nd portion of the duodenum.    PLAN:  - clear liquids  - monitor hemoglobin  - supportive care  - goals of care discussion with patient  - monitor liver enzymes.    CT Angio  IMPRESSION:     No active GI contrast extravasation.    Pancreatic head mass with liver metastases. Suspicious for pancreatic   mass invading duodenum.    Gastric and rectal varices.      Referrals:  Hospice [ ]   Chaplaincy [ ]    Child Life [ ]   Social Work [ ]   Case Management [ ]   Holistic Therapy [ ] HPI:  Obtained from H&P   71 Y/O M admits to PMH of HTN, DM, newly diagnosed metastatic pancreatic adenocarcinoma who initially presented with obstruction s/p ERCP and biliary stent placement on 1/23/18- unresectable secondary to hepatic metastasis currently on palliative chemotherapy with gemcitabine and Abraxane, Q 2 weeks,  C2 on 4/23 , prostate cancer s/p prostatectomy in 2009, presents with  Bright red blood [place] and "dark diarrhea." Patient reports that he initially felt the urge to defecate and had some small dark stool. Then he went to brush his teeth and had another urge with lower abdominal pain that radiated to the back. When he sat on the toilet he said he had a lot of bright red blood as well as dark stool. After that, he got up, felt dizzy and fell to the floor, unwitnessed. Got up by himself after. Does not know if he hit his head. Patient reports nausea last night without vomiting. He has never had bleeding like this, but has noted some streaks of blood on tissue paper in the past. Has not had a colonoscopy. He denies any chest pain, shortness of breath, (07 May 2018 12:25)    Patient found to have bleeding due to tumor invasion of the duodenum - currently receiving RT x 5 sessions     Patient currently laying in bed in no acute distress       PERTINENT PMH REVIEWED:  [x ] YES [ ] NO           SOCIAL HISTORY:  Significant other/partner:  [ ] YES  [ x] NO            Children:  [x ] YES  [ ] NO                   Synagogue/Spirituality:  Confucianism   Substance hx:  [ ] YES   [x ] NO           Tobacco hx:  [x ] YES - former             Alcohol hx: [x ] YES - former (last december 2017) - reports 3 beers and a glass of al daily prior to December      Home Opioid hx:  [x ] YES - oxycodone PRN    Living Situation: [x ] Home with his daughter     FAMILY HISTORY:  Family history of sickle cell anemia (Sibling)  Diabetes mellitus (Sibling)      BASELINE ADLs (prior to admission):  Independent [ ] moderately [x ] fully   Dependent   [ ] moderately [ ] fully    Code Status:  Full Code                          [x ] Surrogate  [ ] HCP  [ ] Guardian:      Pamella Woods                              Phone#:  [phone]    Allergies    No Known Allergies    Intolerances        MEDICATIONS  (STANDING):  docusate sodium 100 milliGRAM(s) Oral daily  influenza   Vaccine 0.5 milliLiter(s) IntraMuscular once  latanoprost 0.005% Ophthalmic Solution 1 Drop(s) Right EYE at bedtime  mirtazapine 15 milliGRAM(s) Oral at bedtime  pantoprazole  Injectable 40 milliGRAM(s) IV Push every 12 hours  senna 1 Tablet(s) Oral at bedtime  timolol 0.5% Solution 1 Drop(s) Right EYE two times a day    MEDICATIONS  (PRN):  oxyCODONE    IR 5 milliGRAM(s) Oral every 4 hours PRN moderate and severe pain      PRESENT SYMPTOMS:  Source: [x ] Patient   [ ] Family   [ ] Team     Pain:  Denies currently.  Patient reports intermittent lower abdominal pain and right mid to low back pain for ~ 4 mths - unable to qualify type of pain, 9/10 at its worst - after he takes oxycodone the pain goes to 0/10.  Patient states that laying down aggravates it and denies radiation.    Dyspnea: [ ] YES [x ] NO - Mild [ ]  Moderate [ ]  Severe [ ]    Anxiety: [ ] YES [x ] NO  Fatigue: [ ] YES [x ] NO   Nausea: [ ] YES [x ] NO  Loss of appetite: [x ] YES - improving the past few weeks on Remeron  Constipation: [x ] YES [ ] NO - chronic - last BM 5/7/18     Other Symptoms:  [x ] All other review of systems negative   [ ] Unable to obtain due to poor mentation     Does patient meet criteria for Severe Protein Calorie Malnutrition?  Yes [ ]  No [ ]  PPSV 30% or below [ ]  Anasarca [ ]  Albumin < 2 [ ] Catabolic State [ ] Poor nutritional intake [ ] Significant weight loss [ ]      Palliative Performance Status Version 2:  80%  ECOG - 1     Vital Signs Last 24 Hrs  T(C): 36.8 (10 May 2018 05:33), Max: 36.8 (09 May 2018 18:44)  T(F): 98.2 (10 May 2018 05:33), Max: 98.3 (09 May 2018 18:44)  HR: 72 (10 May 2018 10:38) (58 - 72)  BP: 136/75 (10 May 2018 10:38) (124/61 - 144/68)  BP(mean): --  RR: 18 (10 May 2018 05:33) (17 - 18)  SpO2: 100% (10 May 2018 05:33) (100% - 100%)    Physical Exam:    General: [x ] Alert,  A&O x 4    Lungs: [x ] Clear anteriorly   Cardiovascular:  [x ] Regular rate and rhythm  [ ] Irregular [ ] Tachycardia   [ ] Bradycardia   Abdomen: [x ] Soft  [ ] Distended  [x ] +BS  [x ] Non tender [ ] Tender  [ ]PEG   [ ] NGT   Last BM: 5/7/18    Genitourinary: [x ] Normal [ ] Incontinent   [ ] Oliguria/Anuria   [ ] Sy  Musculoskeletal:  [ ] Normal   [x ] Generalized weakness  [ ] Bedbound  [ ] Edema   Neurological: [x ] No focal deficits  [ ] Cognitive impairment     Skin: [x ] Normal   [ ] Pressure ulcers     LABS:                        9.8    8.65  )-----------( 315      ( 10 May 2018 05:06 )             30.2     05-10    140  |  107  |  8   ----------------------------<  86  3.5   |  25  |  0.89    Ca    8.7      10 May 2018 05:06  Phos  3.3     05-10  Mg     1.9     05-10    TPro  5.9<L>  /  Alb  3.1<L>  /  TBili  1.2  /  DBili  x   /  AST  21  /  ALT  15  /  AlkPhos  99  05-10        I&O's Summary      RADIOLOGY & ADDITIONAL STUDIES: Reviewed   EGD:  - normal esophagus and stomach  - metal stent at the major papilla noted to be extending upto 3-4 cm into the distal duodenum  - edematous, ulcerated mucosa in the 2nd portion of the duodenum with oozing of blood  - unable to advance endoscope beyond the stent    IMPRESSION:  1) Likely bleeding from tumor invasion into the duodenum - not amenable to endoscopic therapies.  2) CBD stent - not removed given unable to advance the ERCP endoscope into the 2nd portion of the duodenum.    PLAN:  - clear liquids  - monitor hemoglobin  - supportive care  - goals of care discussion with patient  - monitor liver enzymes.    CT Angio  IMPRESSION:     No active GI contrast extravasation.    Pancreatic head mass with liver metastases. Suspicious for pancreatic   mass invading duodenum.    Gastric and rectal varices.      Referrals:  Hospice [ ]   Chaplaincy [ ]    Child Life [ ]   Social Work [ ]   Case Management [ ]   Holistic Therapy [ ]

## 2018-05-10 NOTE — PROGRESS NOTE ADULT - PROBLEM SELECTOR PLAN 3
-Heme/onc consulted. Pt currently on palliative chemo. Now preparing to undergo palliative radiation for tumor.   -Percocet for pain prn. -Heme/onc following. Pt currently on palliative chemo. Now preparing to undergo palliative radiation for tumor.   -Percocet for moderate pain, and morphine for pain,  -Palliative care is seeing him today. Will d/c home with their pain med recs

## 2018-05-11 ENCOUNTER — OUTPATIENT (OUTPATIENT)
Dept: OUTPATIENT SERVICES | Facility: HOSPITAL | Age: 73
LOS: 1 days | Discharge: HOME | End: 2018-05-11
Payer: MEDICARE

## 2018-05-11 DIAGNOSIS — K83.1 OBSTRUCTION OF BILE DUCT: Chronic | ICD-10-CM

## 2018-05-11 DIAGNOSIS — Z98.49 CATARACT EXTRACTION STATUS, UNSPECIFIED EYE: Chronic | ICD-10-CM

## 2018-05-11 DIAGNOSIS — Z98.890 OTHER SPECIFIED POSTPROCEDURAL STATES: Chronic | ICD-10-CM

## 2018-05-16 ENCOUNTER — APPOINTMENT (OUTPATIENT)
Dept: HEMATOLOGY ONCOLOGY | Facility: CLINIC | Age: 73
End: 2018-05-16

## 2018-05-16 PROCEDURE — 77427 RADIATION TX MANAGEMENT X5: CPT

## 2018-05-22 ENCOUNTER — APPOINTMENT (OUTPATIENT)
Dept: HEMATOLOGY ONCOLOGY | Facility: CLINIC | Age: 73
End: 2018-05-22
Payer: MEDICARE

## 2018-05-22 ENCOUNTER — APPOINTMENT (OUTPATIENT)
Dept: HEMATOLOGY ONCOLOGY | Facility: CLINIC | Age: 73
End: 2018-05-22

## 2018-05-22 VITALS
OXYGEN SATURATION: 100 % | DIASTOLIC BLOOD PRESSURE: 70 MMHG | BODY MASS INDEX: 19.6 KG/M2 | SYSTOLIC BLOOD PRESSURE: 118 MMHG | RESPIRATION RATE: 16 BRPM | TEMPERATURE: 98.5 F | WEIGHT: 139.99 LBS | HEART RATE: 92 BPM

## 2018-05-22 DIAGNOSIS — I10 ESSENTIAL (PRIMARY) HYPERTENSION: ICD-10-CM

## 2018-05-22 DIAGNOSIS — C25.9 MALIGNANT NEOPLASM OF PANCREAS, UNSPECIFIED: ICD-10-CM

## 2018-05-22 DIAGNOSIS — E11.9 TYPE 2 DIABETES MELLITUS W/OUT COMPLICATIONS: ICD-10-CM

## 2018-05-22 DIAGNOSIS — C78.7 MALIGNANT NEOPLASM OF PANCREAS, UNSPECIFIED: ICD-10-CM

## 2018-05-22 DIAGNOSIS — R63.0 ANOREXIA: ICD-10-CM

## 2018-05-22 PROCEDURE — 99214 OFFICE O/P EST MOD 30 MIN: CPT

## 2018-05-22 RX ORDER — METFORMIN HYDROCHLORIDE 500 MG/1
500 TABLET, FILM COATED ORAL
Refills: 0 | Status: DISCONTINUED | COMMUNITY
End: 2018-05-22

## 2018-05-22 RX ORDER — LISINOPRIL 5 MG/1
5 TABLET ORAL
Refills: 0 | Status: DISCONTINUED | COMMUNITY
End: 2018-05-22

## 2018-05-22 RX ORDER — AMLODIPINE BESYLATE 2.5 MG/1
2.5 TABLET ORAL DAILY
Refills: 0 | Status: DISCONTINUED | COMMUNITY
Start: 2018-03-30 | End: 2018-05-22

## 2018-06-04 ENCOUNTER — MEDICATION RENEWAL (OUTPATIENT)
Age: 73
End: 2018-06-04

## 2018-06-04 ENCOUNTER — OTHER (OUTPATIENT)
Age: 73
End: 2018-06-04

## 2018-06-04 RX ORDER — OXYCODONE 5 MG/1
5 TABLET ORAL
Qty: 90 | Refills: 0 | Status: ACTIVE | COMMUNITY
Start: 2018-03-30 | End: 1900-01-01

## 2018-06-05 ENCOUNTER — MEDICATION RENEWAL (OUTPATIENT)
Age: 73
End: 2018-06-05

## 2018-06-05 ENCOUNTER — MESSAGE (OUTPATIENT)
Age: 73
End: 2018-06-05

## 2018-06-05 DIAGNOSIS — K59.00 CONSTIPATION, UNSPECIFIED: ICD-10-CM

## 2018-06-08 ENCOUNTER — RX RENEWAL (OUTPATIENT)
Age: 73
End: 2018-06-08

## 2018-06-08 RX ORDER — LACTULOSE 10 G/15ML
10 SOLUTION ORAL DAILY
Qty: 1373 | Refills: 3 | Status: ACTIVE | COMMUNITY
Start: 2018-06-05 | End: 1900-01-01

## 2018-06-26 ENCOUNTER — APPOINTMENT (OUTPATIENT)
Dept: SURGICAL ONCOLOGY | Facility: CLINIC | Age: 73
End: 2018-06-26

## 2018-07-17 PROBLEM — C61 MALIGNANT NEOPLASM OF PROSTATE: Chronic | Status: ACTIVE | Noted: 2018-01-22

## 2022-02-09 NOTE — PROGRESS NOTE ADULT - PROBLEM SELECTOR PROBLEM 7
detailed exam
Hypertension, unspecified type

## 2022-04-20 NOTE — H&P PST ADULT - NEGATIVE RESPIRATORY AND THORAX SYMPTOMS
Patient is here for a Nurse Check.      Is the patient on an active anti-cancer treatment plan, supportive treatment plan, or receiving medications for side effect management (e.g. hydration, zometa, anti-cancer agents, anti-nausea, GSFCs, etc)? Yes.       Oral Chemotherapy: No      Is this patient status post Stem Cell Transplant? No       Km Presley MD is supervising clinician today.    ECOG:   ECOG [04/20/22 1331]   ECOG Performance Status 1       Nursing Assessment:  A comprehensive nursing assessment addressing the side-effects of chemotherapy was performed and the patient reports the following:  Nausea: NO    Anxiety/Depression/Insomnia: no  Pain: NO      Toxicity Assessment    Adverse Events?  Adverse Events: No      Vitals:    04/20/22 1249 04/20/22 1252   BP: 94/75 98/69   BP Location: RUE - Right upper extremity RUE - Right upper extremity   Patient Position: Sitting Standing   Cuff Size: Regular Regular   Pulse: 91 (!) 103   Resp: 14    Temp: 99.1 °F (37.3 °C)    TempSrc: Oral    SpO2: 97% 96%   PainSc:  0             Transfusion: Not needed    Education: Patient Education: Dr Presley aware of labs, VS and pt denies any C/O.  To continue on same meds and plan of care    Next appointment scheduled: one week  Patient instructed to call the office with any questions or concerns.    Patient Discharged: patient discharged to home per self, ambulatory  
Regimen: Chemo rn check    Dr Presley is supervising provider today.    Nursing Assessment: A focused nursing assessment  was performed and the patient reports the following: Nausea: NO  Vomiting: NO  Fever: NO  Chills: NO  Other signs of infection: NO  Bleeding: NO  Mucositis: NO  Diarrhea: NO  Constipation: NO  Anorexia: NO  Dysuria: NO  Urinary Bleeding: NO  Cough: NO  Shortness of Breath: NO  Fatigue/Weakness: NO  Numbness/Tingling: NO  Other Neuropathies: NO  Edema: NO  Rash: NO  Hand/Foot Syndrome: NO  Anxiety/Depression/Insomnia: NO  Pain: NO        
no wheezing/no cough/no dyspnea

## 2025-07-09 NOTE — DISCHARGE NOTE ADULT - CARE PLAN
[Change in Activity] : no change in activity [Fever Above 102] : no fever [Malaise] : no malaise [Rash] : no rash [NI] : Endocrine [Nl] : Hematologic/Lymphatic Principal Discharge DX:	Malignant neoplasm of head of pancreas  Goal:	surgical f/u oncology f/u  Assessment and plan of treatment:	s/p ERCP with stents; f/u with surgical oncology next tuesday - call for appointment; f/u with oncology - call for appointment  Secondary Diagnosis:	Type 2 diabetes mellitus without complication, without long-term current use of insulin  Assessment and plan of treatment:	avoid eating concentrated sweets; monitor BS and A1C with PMD